# Patient Record
Sex: FEMALE | Race: WHITE | Employment: STUDENT | URBAN - METROPOLITAN AREA
[De-identification: names, ages, dates, MRNs, and addresses within clinical notes are randomized per-mention and may not be internally consistent; named-entity substitution may affect disease eponyms.]

---

## 2018-05-04 ENCOUNTER — TRANSFERRED RECORDS (OUTPATIENT)
Dept: HEALTH INFORMATION MANAGEMENT | Facility: CLINIC | Age: 23
End: 2018-05-04

## 2018-05-04 ENCOUNTER — OFFICE VISIT (OUTPATIENT)
Dept: ORTHOPEDICS | Facility: CLINIC | Age: 23
End: 2018-05-04
Payer: COMMERCIAL

## 2018-05-04 ENCOUNTER — MEDICAL CORRESPONDENCE (OUTPATIENT)
Dept: HEALTH INFORMATION MANAGEMENT | Facility: CLINIC | Age: 23
End: 2018-05-04

## 2018-05-04 VITALS
WEIGHT: 136 LBS | BODY MASS INDEX: 21.35 KG/M2 | DIASTOLIC BLOOD PRESSURE: 74 MMHG | SYSTOLIC BLOOD PRESSURE: 106 MMHG | HEIGHT: 67 IN

## 2018-05-04 DIAGNOSIS — S52.571A OTHER CLOSED INTRA-ARTICULAR FRACTURE OF DISTAL END OF RIGHT RADIUS, INITIAL ENCOUNTER: Primary | ICD-10-CM

## 2018-05-04 NOTE — LETTER
Verification of Appointment  May 4, 2018     Seen today: yes    Patient:  Karen Aguayo  :   1995  MRN:     [id number]  Physician: JOSE G CARLISLE    Karen Aguayo was seen in clinic today for her right wrist fracture. Due to her splint and potential for fracture displacement, she is unable to participate in orchestra or write. She will need to remain in the splint for the next week and then potentially progress into a cast. Please provide accommodations for all writing, activities using hand, and band.  She will return to the clinic on Date: 18.      The next clinic appointment is scheduled for (date/time) 18 at 1pm.    Patient limitations:  Writing, band, and all activities that aggravate wrist fracture        Electronically signed by Jose G Carlisle DO

## 2018-05-04 NOTE — PROGRESS NOTES
SPORTS & ORTHOPEDIC WALK-IN VISIT 5/4/2018    Primary Care Physician:      Reason for visit:     What part of your body is injured / painful?  right wrist    What caused the injury /pain? {DS INJURY CAUSE:300276}    How long ago did your injury occur or pain begin? {DOI:280861}    What are your most bothersome symptoms? {SYMPTOMS:347165}    How would you characterize your symptom?  {PAIN ASSESSMENT:870642}    What makes your symptoms better? {SX BETTER:769312}    What makes your symptoms worse? {SX WORSE:263100}    Have you been previously seen for this problem? {YES/NO:253523}    Medical History:    Any recent changes to your medical history? {YES/NO:062906}    Any new medication prescribed since last visit? {YES/NO:229309}    Have you had surgery on this body part before? {YES/NO SX:070835}    Social History:    Occupation: ***    Handedness: {HANDDOMINANCE:163881}    Exercise: {EXERCISE TYPE:273202}    Review of Systems:    Do you have fever, chills, weight loss? {YES/NO:348579}    Do you have any vision problems? {YES/NO:564542}    Do you have any chest pain or edema? {YES/NO:864792}    Do you have any shortness of breath or wheezing?  {YES/NO:699803}    Do you have stomach problems? {YES/NO:577254}    Do you have any numbness or focal weakness? {YES/NO:342006}    Do you have diabetes? {YES/NO:546031}    Do you have problems with bleeding or clotting? {YES/NO:480129}    Do you have an rashes or other skin lesions? {YES/NO:607421}
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
## 2018-05-04 NOTE — MR AVS SNAPSHOT
After Visit Summary   2018    Karen Aguayo    MRN: [id number]           Patient Information     Date Of Birth          1995        Visit Information        Provider Department      2018 3:10 PM Jose G Cannon DO [facility]opa[facility] Walk [facility]        Today's Diagnoses     Other closed intra-articular fracture of distal end of right radius, initial encounter    -  1       Follow-ups after your visit        Your next 10 appointments already scheduled     May 09, 2018  1:00 PM CDT   (Arrive by 12:45 PM)   NEW HAND FRACTURE with Ezio Yin MD   [facility] ([facility])    [address] [place] [address]  4th [facility] [id number] [phone]              Who to contact     Please call your clinic at [phone] to:    Ask questions about your health    Make or cancel appointments    Discuss your medicines    Learn about your test results    Speak to your doctor            Additional Information About Your Visit        MyChart Information     [employer] is an electronic gateway that provides easy, online access to your medical records. With [employer], you can request a clinic appointment, read your test results, renew a prescription or communicate with your care team.     To sign up for [employer] visit the website at [email or web address]   You will be asked to enter the access code listed below, as well as some personal information. Please follow the directions to create your username and password.     Your access code is: [email or web address]  Expires: 2018  5:00 PM     Your access code will  in 90 days. If you need help or a new code, please contact your [facility] or call [phone] for assistance.        Care EveryWhere ID     This is your Care EveryWhere ID. This could be used by other organizations to access your [facility] medical records  [id number]        Your Vitals Were      "Height BMI (Body Mass Index)                1.702 m (5' 7\") 21.3 kg/m2           Blood Pressure from Last 3 Encounters:   05/04/18 106/74    Weight from Last 3 Encounters:   05/04/18 61.7 kg (136 lb)              Today, you had the following     No orders found for display       Primary Care Provider    None Specified       No primary provider on file.        Equal Access to Services     [facility]: Hadii aad ku hadasho Soheathali, waaxda luqadaha, qaybta kaalmada adeegyada, waxay idiin haycaron atriq fiorefunmilayobartolo bolaños . So [facility] [phone].    ATENCIÓN: Si habla español, tiene a vallejo disposición servicios gratuitos de asistencia lingüística. Llame al [phone].    We comply with applicable federal civil rights laws and [place] laws. We do not discriminate on the basis of race, color, national origin, age, disability, sex, sexual orientation, or gender identity.            Thank you!     Thank you for choosing [facility] SPORTS AND ORTHOPAEDIC WALK IN CLINIC  for your care. Our goal is always to provide you with excellent care. Hearing back from our patients is one way we can continue to improve our services. Please take a few minutes to complete the written survey that you may receive in the mail after your visit with us. Thank you!             Your Updated Medication List - Protect others around you: Learn how to safely use, store and throw away your medicines at www.disposemymeds.org.      Notice  As of 5/4/2018 11:59 PM    You have not been prescribed any medications.      "
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
## 2018-05-07 NOTE — TELEPHONE ENCOUNTER
FUTURE VISIT INFORMATION      FUTURE VISIT INFORMATION:    Date: 5/9/18    Time:     Location: [facility]   REFERRAL INFORMATION:    Referring provider:  Dr. Cannon     Referring providers clinic:  Walk in Clinic     Reason for visit/diagnosis  Right distal radius fracture     RECORDS REQUESTED FROM:       Clinic name Comments Records Status Imaging Status   Walk in Clinic  5/4/18 Internal PACS     RECORDS STATUS

## 2018-05-08 ENCOUNTER — PRE VISIT (OUTPATIENT)
Dept: ORTHOPEDICS | Facility: CLINIC | Age: 23
End: 2018-05-08

## 2018-05-08 DIAGNOSIS — S52.501A FRACTURE OF RIGHT DISTAL RADIUS: Primary | ICD-10-CM

## 2018-05-08 NOTE — TELEPHONE ENCOUNTER
Patient is being referred by Dr. Jose G Cannon for right distal radius fracture.    Patient is new to this provider.  Patient has outside records have been placed in chart prep folder    Patient is coming to clinic for initial consultation.      Per standing orders, right wrist xrays have been ordered and scheduled.     Patient visit type and questionnaires have been reviewed and are correct for this appointment? Yes    Britany Aquino, ATC

## 2018-05-09 ENCOUNTER — PRE VISIT (OUTPATIENT)
Dept: ORTHOPEDICS | Facility: CLINIC | Age: 23
End: 2018-05-09

## 2018-05-09 ENCOUNTER — OFFICE VISIT (OUTPATIENT)
Dept: ORTHOPEDICS | Facility: CLINIC | Age: 23
End: 2018-05-09
Payer: COMMERCIAL

## 2018-05-09 ENCOUNTER — RADIANT APPOINTMENT (OUTPATIENT)
Dept: GENERAL RADIOLOGY | Facility: CLINIC | Age: 23
End: 2018-05-09
Attending: ORTHOPAEDIC SURGERY
Payer: COMMERCIAL

## 2018-05-09 ENCOUNTER — RADIANT APPOINTMENT (OUTPATIENT)
Dept: CT IMAGING | Facility: CLINIC | Age: 23
End: 2018-05-09
Attending: ORTHOPAEDIC SURGERY
Payer: COMMERCIAL

## 2018-05-09 VITALS — HEIGHT: 67 IN | BODY MASS INDEX: 21.25 KG/M2 | WEIGHT: 135.4 LBS

## 2018-05-09 DIAGNOSIS — S62.109A FRACTURE OF WRIST: ICD-10-CM

## 2018-05-09 DIAGNOSIS — S52.501A FRACTURE OF RIGHT DISTAL RADIUS: ICD-10-CM

## 2018-05-09 DIAGNOSIS — S52.501A CLOSED FRACTURE OF DISTAL END OF RIGHT RADIUS, UNSPECIFIED FRACTURE MORPHOLOGY, INITIAL ENCOUNTER: Primary | ICD-10-CM

## 2018-05-09 NOTE — NURSING NOTE
Teaching Flowsheet   Relevant Diagnosis: Right distal radius fracture  Teaching Topic: ORIF right distal radius      Person(s) involved in teaching:   Patient     Motivation Level:  Asks Questions: Yes  Eager to Learn: Yes  Cooperative: Yes  Receptive (willing/able to accept information): Yes  Any cultural factors/[employer] beliefs that may influence understanding or compliance? No     Patient demonstrates understanding of the following:  Reason for the appointment, diagnosis and treatment plan: Yes  Knowledge of proper use of medications and conditions for which they are ordered (with special attention to potential side effects or drug interactions): Yes  Which situations necessitate calling provider and whom to contact: Yes    Teaching Concerns Addressed:   Comments:  H&P completed today in clinic.       Nutritional needs and diet plan: Yes  Pain management techniques: Yes  Wound Care: Yes  How and/when to access community resources: Yes     Instructional Materials Used/Given: Pre-op packet given and reviewed with patient.  Antiseptic soap given.  She will get an CT completed today.  Dr Yin will review the CT and then we will decide on when surgery date will be.  Patient will be called today with a surgery date.   Patient will need to follow up with Dr. Yin in 2 weeks.    Her phone number and email was updated in chart.     Time spent with patient: 15 minutes.

## 2018-05-09 NOTE — LETTER
Date:May 14, 2018      Patient was self referred, no letter generated. Do not send.        [facility] Health Information
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
## 2018-05-09 NOTE — LETTER
Date:May 14, 2018      Patient was self referred, no letter generated. Do not send.        [facility] Information
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
## 2018-05-09 NOTE — MR AVS SNAPSHOT
After Visit Summary   5/9/2018    Karen Aguayo    MRN: [id number]           Patient Information     Date Of Birth          1995        Visit Information        Provider Department      5/9/2018 1:00 PM Ezio Yin MD [facility]        Today's Diagnoses     Closed fracture of distal end of right radius, unspecified fracture morphology, initial encounter    -  1       Follow-ups after your visit        Your next 10 appointments already scheduled     May 15, 2018   Procedure with Ezio Yin MD   [facility] ([facility] [place])    [address] [place] [address]  5th [facility] [id number] [phone]           Located in the Clinics and Surgery Center at [address] [place] [address].   parking is very convenient and highly recommended.  is a $6 flat rate fee.  Both  and self parkers should enter the main arrival plaza from [facility]; parking attendants will direct you based on your parking preference.            May 30, 2018 10:15 AM CDT   (Arrive by 10:00 AM)   RETURN HAND with Ezio Yin MD   [facility] ([facility] [place])    [address] [place] [address]  4th [facility] [id number] [phone]              Who to contact     Please call your clinic at [phone] to:    Ask questions about your health    Make or cancel appointments    Discuss your medicines    Learn about your test results    Speak to your doctor            Additional Information About Your Visit        MyChart Information     [employer]ipp[employer]t is an electronic gateway that provides easy, online access to your medical records. With Kor[employer], you can request a clinic appointment, read your test results, renew a prescription or communicate with your care team.     To sign up for Hippflowt visit the website at [email or web address]   You will be asked to enter the access code  "listed below, as well as some personal information. Please follow the directions to create your username and password.     Your access code is: [email or web address]  Expires: 2018  5:00 PM     Your access code will  in 90 days. If you need help or a new code, please contact your [facility] or call [phone] for assistance.        Care EveryWhere ID     This is your Care EveryWhere ID. This could be used by other organizations to access your [facility] medical records  [id number]        Your Vitals Were     Height BMI (Body Mass Index)                1.702 m (5' 7\") 21.21 kg/m2           Blood Pressure from Last 3 Encounters:   18 106/74    Weight from Last 3 Encounters:   18 61.4 kg (135 lb 6.4 oz)   18 61.7 kg (136 lb)              We Performed the Following     APPLY SHORT ARM SPLINT STATIC     Ita-Operative Worksheet (Hand)     Short Arm Splint, Adult (11 Years Or Older), Fiberglass        Primary Care Provider    None Specified       No primary provider on file.        Equal Access to Services     [facility]: Hadii lizzie garcia Soanuj, waaxda lucharli, qaybta kaalmakatherine torres, tate bolaños . So [facility] [phone].    ATENCIÓN: Si habla español, tiene a vallejo disposición servicios gratuitos de asistencia lingüística. Aftabame al [phone].    We comply with applicable federal civil rights laws and [place] laws. We do not discriminate on the basis of race, color, national origin, age, disability, sex, sexual orientation, or gender identity.            Thank you!     Thank you for choosing [facility]  for your care. Our goal is always to provide you with excellent care. Hearing back from our patients is one way we can continue to improve our services. Please take a few minutes to complete the written survey that you may receive in the mail after your visit with us. Thank you!             Your Updated Medication " List - Protect others around you: Learn how to safely use, store and throw away your medicines at www.disposemymeds.org.      Notice  As of 5/9/2018 11:59 PM    You have not been prescribed any medications.
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
## 2018-05-09 NOTE — NURSING NOTE
Patient is placed into a well fitting short arm volar OrthoGlass splint using the standard method. She is educated on proper splint care.

## 2018-05-10 ENCOUNTER — TELEPHONE (OUTPATIENT)
Dept: ORTHOPEDICS | Facility: CLINIC | Age: 23
End: 2018-05-10

## 2018-05-10 NOTE — TELEPHONE ENCOUNTER
Called placed to patient to review surgery plan for 5/15/18 with Dr. Yin.  Voicemail left with surgery time of 8am and check in time of 6:30am at the [facility].  She has the clinic phone number for further questions or concerns.

## 2018-05-12 ENCOUNTER — RADIANT APPOINTMENT (OUTPATIENT)
Dept: GENERAL RADIOLOGY | Facility: CLINIC | Age: 23
End: 2018-05-12
Attending: ORTHOPAEDIC SURGERY
Payer: COMMERCIAL

## 2018-05-12 DIAGNOSIS — S52.501A CLOSED FRACTURE OF DISTAL END OF RIGHT RADIUS, UNSPECIFIED FRACTURE MORPHOLOGY, INITIAL ENCOUNTER: ICD-10-CM

## 2018-05-14 ENCOUNTER — ANESTHESIA EVENT (OUTPATIENT)
Dept: SURGERY | Facility: AMBULATORY SURGERY CENTER | Age: 23
End: 2018-05-14

## 2018-05-15 ENCOUNTER — RADIANT APPOINTMENT (OUTPATIENT)
Dept: RADIOLOGY | Facility: AMBULATORY SURGERY CENTER | Age: 23
End: 2018-05-15
Attending: ORTHOPAEDIC SURGERY
Payer: COMMERCIAL

## 2018-05-15 ENCOUNTER — HOSPITAL ENCOUNTER (OUTPATIENT)
Facility: AMBULATORY SURGERY CENTER | Age: 23
End: 2018-05-15
Attending: ORTHOPAEDIC SURGERY
Payer: COMMERCIAL

## 2018-05-15 ENCOUNTER — ANESTHESIA (OUTPATIENT)
Dept: SURGERY | Facility: AMBULATORY SURGERY CENTER | Age: 23
End: 2018-05-15

## 2018-05-15 ENCOUNTER — SURGERY (OUTPATIENT)
Age: 23
End: 2018-05-15

## 2018-05-15 VITALS
RESPIRATION RATE: 14 BRPM | WEIGHT: 137.5 LBS | DIASTOLIC BLOOD PRESSURE: 62 MMHG | HEIGHT: 67 IN | SYSTOLIC BLOOD PRESSURE: 105 MMHG | BODY MASS INDEX: 21.58 KG/M2 | TEMPERATURE: 98.6 F | OXYGEN SATURATION: 97 %

## 2018-05-15 DIAGNOSIS — S52.561A CLOSED BARTON'S FRACTURE OF RIGHT RADIUS, INITIAL ENCOUNTER: Primary | ICD-10-CM

## 2018-05-15 DIAGNOSIS — R52 PAIN: ICD-10-CM

## 2018-05-15 LAB
HCG UR QL: NEGATIVE
INTERNAL QC OK POCT: YES

## 2018-05-15 RX ORDER — AMOXICILLIN 250 MG
1-2 CAPSULE ORAL 2 TIMES DAILY
Qty: 30 TABLET | Refills: 0 | Status: SHIPPED | OUTPATIENT
Start: 2018-05-15 | End: 2020-01-26

## 2018-05-15 RX ORDER — ONDANSETRON 2 MG/ML
INJECTION INTRAMUSCULAR; INTRAVENOUS PRN
Status: DISCONTINUED | OUTPATIENT
Start: 2018-05-15 | End: 2018-05-15

## 2018-05-15 RX ORDER — NALOXONE HYDROCHLORIDE 0.4 MG/ML
.1-.4 INJECTION, SOLUTION INTRAMUSCULAR; INTRAVENOUS; SUBCUTANEOUS
Status: DISCONTINUED | OUTPATIENT
Start: 2018-05-15 | End: 2018-05-15 | Stop reason: HOSPADM

## 2018-05-15 RX ORDER — KETAMINE HYDROCHLORIDE 10 MG/ML
INJECTION INTRAMUSCULAR; INTRAVENOUS PRN
Status: DISCONTINUED | OUTPATIENT
Start: 2018-05-15 | End: 2018-05-15

## 2018-05-15 RX ORDER — ONDANSETRON 2 MG/ML
4 INJECTION INTRAMUSCULAR; INTRAVENOUS EVERY 30 MIN PRN
Status: DISCONTINUED | OUTPATIENT
Start: 2018-05-15 | End: 2018-05-16 | Stop reason: HOSPADM

## 2018-05-15 RX ORDER — SODIUM CHLORIDE, SODIUM LACTATE, POTASSIUM CHLORIDE, CALCIUM CHLORIDE 600; 310; 30; 20 MG/100ML; MG/100ML; MG/100ML; MG/100ML
INJECTION, SOLUTION INTRAVENOUS CONTINUOUS
Status: DISCONTINUED | OUTPATIENT
Start: 2018-05-15 | End: 2018-05-15 | Stop reason: HOSPADM

## 2018-05-15 RX ORDER — FLUMAZENIL 0.1 MG/ML
0.2 INJECTION, SOLUTION INTRAVENOUS
Status: DISCONTINUED | OUTPATIENT
Start: 2018-05-15 | End: 2018-05-15 | Stop reason: HOSPADM

## 2018-05-15 RX ORDER — KETOROLAC TROMETHAMINE 30 MG/ML
INJECTION, SOLUTION INTRAMUSCULAR; INTRAVENOUS PRN
Status: DISCONTINUED | OUTPATIENT
Start: 2018-05-15 | End: 2018-05-15

## 2018-05-15 RX ORDER — GABAPENTIN 300 MG/1
300 CAPSULE ORAL ONCE
Status: COMPLETED | OUTPATIENT
Start: 2018-05-15 | End: 2018-05-15

## 2018-05-15 RX ORDER — PROPOFOL 10 MG/ML
INJECTION, EMULSION INTRAVENOUS CONTINUOUS PRN
Status: DISCONTINUED | OUTPATIENT
Start: 2018-05-15 | End: 2018-05-15

## 2018-05-15 RX ORDER — FENTANYL CITRATE 50 UG/ML
25-50 INJECTION, SOLUTION INTRAMUSCULAR; INTRAVENOUS
Status: DISCONTINUED | OUTPATIENT
Start: 2018-05-15 | End: 2018-05-15 | Stop reason: HOSPADM

## 2018-05-15 RX ORDER — OXYCODONE AND ACETAMINOPHEN 5; 325 MG/1; MG/1
1-2 TABLET ORAL EVERY 4 HOURS PRN
Qty: 30 TABLET | Refills: 0 | Status: SHIPPED | OUTPATIENT
Start: 2018-05-15 | End: 2020-01-26

## 2018-05-15 RX ORDER — ONDANSETRON 4 MG/1
4 TABLET, ORALLY DISINTEGRATING ORAL EVERY 30 MIN PRN
Status: DISCONTINUED | OUTPATIENT
Start: 2018-05-15 | End: 2018-05-16 | Stop reason: HOSPADM

## 2018-05-15 RX ORDER — PROPOFOL 10 MG/ML
INJECTION, EMULSION INTRAVENOUS PRN
Status: DISCONTINUED | OUTPATIENT
Start: 2018-05-15 | End: 2018-05-15

## 2018-05-15 RX ORDER — SODIUM CHLORIDE, SODIUM LACTATE, POTASSIUM CHLORIDE, CALCIUM CHLORIDE 600; 310; 30; 20 MG/100ML; MG/100ML; MG/100ML; MG/100ML
INJECTION, SOLUTION INTRAVENOUS CONTINUOUS
Status: DISCONTINUED | OUTPATIENT
Start: 2018-05-15 | End: 2018-05-16 | Stop reason: HOSPADM

## 2018-05-15 RX ORDER — BUPIVACAINE HYDROCHLORIDE 5 MG/ML
INJECTION, SOLUTION EPIDURAL; INTRACAUDAL PRN
Status: DISCONTINUED | OUTPATIENT
Start: 2018-05-15 | End: 2018-05-15

## 2018-05-15 RX ORDER — ACETAMINOPHEN 325 MG/1
975 TABLET ORAL ONCE
Status: COMPLETED | OUTPATIENT
Start: 2018-05-15 | End: 2018-05-15

## 2018-05-15 RX ADMIN — KETAMINE HYDROCHLORIDE 30 MG: 10 INJECTION INTRAMUSCULAR; INTRAVENOUS at 08:38

## 2018-05-15 RX ADMIN — ACETAMINOPHEN 975 MG: 325 TABLET ORAL at 07:40

## 2018-05-15 RX ADMIN — GABAPENTIN 300 MG: 300 CAPSULE ORAL at 07:40

## 2018-05-15 RX ADMIN — ONDANSETRON 4 MG: 2 INJECTION INTRAMUSCULAR; INTRAVENOUS at 08:30

## 2018-05-15 RX ADMIN — BUPIVACAINE HYDROCHLORIDE 15 ML: 5 INJECTION, SOLUTION EPIDURAL; INTRACAUDAL at 07:40

## 2018-05-15 RX ADMIN — PROPOFOL: 10 INJECTION, EMULSION INTRAVENOUS at 09:09

## 2018-05-15 RX ADMIN — PROPOFOL 150 MCG/KG/MIN: 10 INJECTION, EMULSION INTRAVENOUS at 08:18

## 2018-05-15 RX ADMIN — KETOROLAC TROMETHAMINE 30 MG: 30 INJECTION, SOLUTION INTRAMUSCULAR; INTRAVENOUS at 10:04

## 2018-05-15 RX ADMIN — FENTANYL CITRATE 50 MCG: 50 INJECTION, SOLUTION INTRAMUSCULAR; INTRAVENOUS at 07:37

## 2018-05-15 RX ADMIN — SODIUM CHLORIDE, SODIUM LACTATE, POTASSIUM CHLORIDE, CALCIUM CHLORIDE: 600; 310; 30; 20 INJECTION, SOLUTION INTRAVENOUS at 07:36

## 2018-05-15 RX ADMIN — PROPOFOL 120 MG: 10 INJECTION, EMULSION INTRAVENOUS at 08:36

## 2018-05-15 NOTE — BRIEF OP NOTE
[facility] Brief Operative Note    Pre-operative diagnosis: Right Distal Radius Fracture   Post-operative diagnosis * No post-op diagnosis entered *   Procedure: Procedure(s):  Open Reduction Internal Fixation Right Distal Radius Fracture  - Wound Class: I-Clean   Surgeon: zEio Yin   Assistants(s): Jackeline Pabon PGY-4   Estimated blood loss: 15 mL    Specimens: None   Findings: Stable fixation of right intraarticular distal radius fracture. Log split through lunate facet and dye punch through scaphoid facet. Tamped up the scaphoid facet and kept elevated with crushed cancellous bone graft. Synthes volar locking plate.     -Keep splint in place until clinic follow up. Keep clean and dry.   -Ice, elevate.   -oxycodone PRN pain control.   -Ok for finger ROM.    Jackeline Pabon PGY-4  Orthopedic Surgery  [phone]

## 2018-05-15 NOTE — ANESTHESIA PROCEDURE NOTES
Peripheral Nerve Block Procedure Note    Staff:     Anesthesiologist:  IONA SOTO    Resident/CRNA:  KATHLEEN RUDOLPH    Block performed by resident/CRNA in the presence of a teaching physician    Location: Pre-op  Procedure Start/Stop TImes:      5/15/2018 7:30 AM     5/15/2018 7:40 AM    patient identified, IV checked, site marked, risks and benefits discussed, informed consent, monitors and equipment checked, pre-op evaluation, at physician/surgeon's request and post-op pain management      Correct Patient: Yes      Correct Position: Yes      Correct Site: Yes      Correct Procedure: Yes      Correct Laterality:  Yes    Site Marked:  Yes  Procedure details:     Procedure:  Supraclavicular    Laterality:  Right    Position:  Supine    Sterile Prep: chloraprep, mask and sterile gloves      Local skin infiltration:  None    Needle:  Short bevel and insulated    Needle gauge:  21    Needle length (mm):  100    Ultrasound: Yes      Ultrasound used to identify targeted nerve, plexus, or vascular structure and placed a needle adjacent to it      Permanent Image entered into patiient's record      Abnormal pain on injection: No      Blood Aspirated: No      Paresthesias:  No    Bleeding at site: No      Bolus via:  Needle    Infusion Method:  Single Shot    Complications:  None  Assessment/Narrative:     Injection made incrementally with aspirations every (mL):  5     Discussed with Patient Off-Label use of Liposomal Bupivacaine (Exparel) for Nerve Block.    Relevant risks & benefits were discussed with patient.    All questions were answered and there was agreement to proceed.    Patient signed Off-Label Use of Exparel Consent Form.

## 2018-05-15 NOTE — IP AVS SNAPSHOT
[facility]    [address] [place] [id number]    Phone:  [phone]    Fax:  [phone]                                       After Visit Summary   5/15/2018    Karen Aguayo    MRN: [id number]           After Visit Summary Signature Page     I have received my discharge instructions, and my questions have been answered. I have discussed any challenges I see with this plan with the nurse or doctor.    ..........................................................................................................................................  Patient/Patient Representative Signature      ..........................................................................................................................................  Patient Representative Print Name and Relationship to Patient    ..................................................               ................................................  Date                                            Time    ..........................................................................................................................................  Reviewed by Signature/Title    ...................................................              ..............................................  Date                                                            Time

## 2018-05-15 NOTE — ANESTHESIA CARE TRANSFER NOTE
Patient: Karen Aguayo    Procedure(s):  Open Reduction Internal Fixation Right Distal Radius Fracture  - Wound Class: I-Clean    Diagnosis: Right Distal Radius Fracture  Diagnosis Additional Information: No value filed.    Anesthesia Type:   MAC, Periph. Nerve Block for postop pain, Peripheral Nerve Block     Note:  Airway :Nasal Cannula  Patient transferred to:PACU  Comments: 121/71 96%  97.3-80-12  Handoff Report: Identifed the Patient, Identified the Reponsible Provider, Reviewed the pertinent medical history, Discussed the surgical course, Reviewed Intra-OP anesthesia mangement and issues during anesthesia, Set expectations for post-procedure period and Allowed opportunity for questions and acknowledgement of understanding      Vitals: (Last set prior to Anesthesia Care Transfer)    CRNA VITALS  5/15/2018 1006 - 5/15/2018 1043      5/15/2018             Resp Rate (observed): (!)  6                Electronically Signed By: FROY Hutton CRNA  May 15, 2018  10:43 AM

## 2018-05-15 NOTE — IP AVS SNAPSHOT
MRN:[id number]                      After Visit Summary   5/15/2018    Karen Aguayo    MRN: [id number]           Thank you!     Thank you for choosing [facility] for your care. Our goal is always to provide you with excellent care. Hearing back from our patients is one way we can continue to improve our services. Please take a few minutes to complete the written survey that you may receive in the mail after you visit with us. Thank you!        Patient Information     Date Of Birth          1995        About your hospital stay     You were admitted on:  May 15, 2018 You last received care in the:  [facility]    You were discharged on:  May 15, 2018       Who to Call     For medical emergencies, please call 911.  For non-urgent questions about your medical care, please call your primary care provider or clinic, None  For questions related to your surgery, please call your surgery clinic        Attending Provider     Provider Ezio Cody MD Orthopedics       Primary Care Provider    None Specified      After Care Instructions     Discharge Instructions       Post Operative Instructions for Karen Aguayo is a 22 year old female    Surgery: plate fixation of distal radius fracture on 5/15/18.    If you have any questions contact Dr. Yin at the following numbers: if you see Dr. Yin at [facility] call [phone].        Follow up appointment:   You are scheduled to follow up with Dr. Yin 5/30/18.   If you were seen at the [facility] at [facility], your appointment will likely be there.         Activity:   -Move the finger frequently to decrease swelling.  No lifting or gripping with the operative hand.     Pain Medications:   -Take pain medications as prescribed.  Wean off the the narcotic pain medications (Oxycodone, Dilaudid, etc) as tolerated by pain.  To help with this, take the Tylenol and Celebrex (if prescribed) to minimize the  amount of narcotic pain medication you need to take.      -Do not drive while on pain medications or until your leg feels well enough to do so.      Bandage Care and Showering:   -Do not remove the splint. Keep the splint clean and dry. You must cover it with a bag or cast cover to shower.     --Contact Dr. Yin or her staff if there is any drainage from the incision or redness.    Leg Swelling and Icing  -Continue icing your back p 5-6 times per day for approximately 20 minutes each time.  This will help with swelling.    -Some swelling in the fingers and arm is normal after surgery. Elevate on pillows above the level of the heart to decrease pain and swelling.  If the splint feels too tight, you may unwrap the brown outer wrapping to loosen it.      Contact clinic if you note any of the following:  -Fevers greater than 101.5 chills  -Increased pain, redness, swelling or discharge at the surgical site.   -During regular business hours call Dr Velázquez's office and request to speak with his nurse or the triage nurses. If you see him at [facility] call [phone].  -You may also be seen at our Orthopaedic Walk-In clinic that runs 7 days per week from 7a-7p at [address] [place] [id number]   You can call the Walk-In Clinic at [phone]      -FOR URGENT PROBLEMS ONLY, after hours or on weekends call the hospital  at [phone] and ask to speak with the Orthopaedic resident on call.            Ice to affected area       Ice pack to surgical site every 15 minutes per hour for 24 hours            No Dressing Change       No dressing change until follow up appointment.            No weight bearing           Notify Provider       For signs and symptoms of infection: Fever greater than 101, redness, swelling, heat at site, drainage, pus.            Shower        Cover dressing if dressing is not going to be changed today            Wound care       Do not immerse wound in water until sutures  "removed                  Your next 10 appointments already scheduled     May 30, 2018 10:15 AM CDT   (Arrive by 10:00 AM)   RETURN HAND with Ezio Yin MD   [facility] ([facility])    [address] [place] [id number] [phone]              Further instructions from your care team       Instructions for after your surgery    Leave your splint on and keep it dry. Do not remove it or get it wet.     Keep your operative arm elevated as much as possible. This will help with pain and swelling.     Do not use your operative arm for any lifting, pushing, pulling, weight bearing, or other activities.     Wear your sling as needed for comfort. If you choose to wear the sling, be sure to take it off and range your shoulder and elbow (if not included in the splint) a few times a day so they do not get stiff. (If you have received a regional block, wear the sling at all times until the block wears off.)     You will have a follow-up appointment scheduled with Dr. Yin or Annalisa. If you do not know when this appointment is, please call Dr. Yin's office to find out.     Call Dr. Yin's office if you experience any of the following:   Fevers, chills, increasing wound drainage, pain that is not controlled with the medications you have been prescribing, swelling that is not controlled with elevation, problems with your splint, or any other questions or concerns.       Information about liposomal bupivacaine (Exparel)    What is Liposomal Bupivacaine?    Liposomal Bupivacaine is a numbing medication that can help you manage your pain after surgery.  This medication is similar to \"novacaine,\" which is often used by the dentist.  Liposomal bupivacaine is released slowly and can help control pain for up to 72 hours.    What is the purpose of Liposomal Bupivacaine?    To manage your pain after surgery    To help you sleep better, take deep " breaths, walk more comfortable, and feel up to visiting with others    How is the procedure done?    Liposomal bupivacaine is a medication given by an injection.    It is usually given right before your surgery.  If this is the case, you will be awake or sedated, but you should experience minimal pain during the procedure.    For some people, the injection may be given at the very end of your surgery.  It all depends on the type of surgery and your situation.    The procedure usually takes about 5-15 minutes.  An ultrasound machine will help the anesthesiologist insert it in the right place or the surgeon will inject it under direct vision.     A needle is used to place the numbing medication under your skin.  It provides pain relief by numbing the tissue in the area where your surgeon will make the incision.    What can I expect?    You may experience numbness, tingling, or a feeling of heaviness around the area that was injected.    If you experience any of the follow symptoms IMMEDIATELY CALL THE REGIONAL ANESTHESIA PAIN SERVICE:    Numbness or tingling occurs in areas other than around the injection site    Blurry vision    Ringing in your ears    A metallic taste in your mouth    PAGE: Dial [phone].  When prompted, enter the following 4-digit ID number:  [id number].  You will be prompted to enter your phone number; and then enter the # sign.  The clinician on call will call you back.    OR    CALL: Dial [phone].  Let the hospital  know that you are having a problem with a nerve block and that you would like to speak to the regional anesthesia pain service right away.    You should not receive any other type of numbing medication within 4 days after receiving liposomal bupivacaine unless your anesthesiologist approves.      Post Operative Instructions: Regional Anesthetic for Upper Extremity with Liposomal Bupivacaine  General Information:   Regional anesthesia is when local anesthetic or  numbing   medication is injected around the nerves to anesthetize or  numb  the area supplied by that set of nerves. It is a type of analgesia used to control pain and decreases the need for narcotics following surgery.    Types of Regional Blocks:  Interscalene: A block injected into the neck on the operative shoulder/arm of a patient having shoulder surgery  Supraclavicular: A block injected near the clavicle on the operative shoulder of a patient having elbow, forearm, or hand surgery    Procedure:  The type of anesthesia your doctor used to numb your shoulder or arm will usually not start to wear off for 24-48 hours, but may last as long as 72 hours. You should be careful during that period, since it is possible to injure your arm without being aware of the injury. While your arm is numb, you should:    Avoid striking or bumping your arm    Avoid extreme hot or cold    Diet:  There are no restrictions on your diet. You should drink plenty of fluids.     Discomfort:  You will have a tingling and prickly sensation in your arm as the feeling begins to return. You can also expect some discomfort. The amount of discomfort is unpredictable, but if you have more pain than can be controlled with pain medication you should notify your physician.     Pain Medications:  Begin taking your oral pain pills before bedtime and during the night to avoid a sudden onset of pain as part of the block wears off.  Do not engage in drinking, driving, or hazardous occupations while taking pain medication.     Stitches:   You may have stitches or special skin closures. You doctor will inform you when to return to the office to have them removed.     Activity:  On the day of surgery you should try to stay in bed with your hand elevated on pillows. You may resume your normal activity after that, wearing a sling for comfort. Contact your physician if you have any of the problems:     Continued numbness or tingling in the arm or hand after 72  "hours    Swelling of the fingers or fingers that are cold to the touch    Excessive bleeding or drainage    Severe pain  [facility]  Home Care Following Anesthesia  For 24 hours after surgery:  1. Get plenty of rest.  A responsible adult must stay with you for at least 24 hours after you leave the surgery center.  2. Do not drive or use heavy equipment.  If you have weakness or tingling, don't drive or use heavy equipment until this feeling goes away.   3. Do not drink alcohol.   4. Avoid strenuous or risky activities.  Ask for help when climbing stairs.  5. You may feel lightheaded.  IF so, sit for a few minutes before standing.  Have someone help you get up.   6. If you have nausea (feel sick to your stomach): Drink only clear liquids such as apple juice, ginger ale, broth or 7-Up.  Rest may also help.  Be sure to drink enough fluids.  Move to a regular diet as you feel able.   7. You may have a slight fever.  Call the doctor if your fever is over 100 F (37.7 C) (taken under the tongue) or lasts longer than 24 hours.  8. You may have a dry mouth, a sore throat, muscle aches or trouble sleeping. These should go away after 24 hours.  9. Do not make important or legal decisions.   If you use hormonal birth control (such as the pill, patch, ring or implants):  You will need a second form of birth control for 7 days (condoms, a diaphragm or contraceptive foam).  While in the surgery center, you received a medicine called Sugammadex.  Hormonal birth control (such as the pill, patch, ring or implants) will not work as well for a week after taking this medicine.  Today you received a Marcaine or bupivacaine block to numb the nerves near your surgery site.  This is a block using local anesthetic or \"numbing\" medication injected around the nerves to anesthetize or \"numb\" the area supplied by those nerves.  This block is injected into the muscle layer near your surgical site.  The " medication may numb the location where you had surgery for 6-18 hours, but may last up to 24 hours.  If your surgical site is an arm or leg you should be careful with your affected limb, since it is possible to injure your limb without being aware of it due to the numbing.  Until full feeling returns, you should guard against bumping or hitting your limb, and avoid extreme hot or cold temperatures on the skin.  As the block wears off, the feeling will return as a tingling or prickly sensation near your surgical site.  You will experience more discomfort from your incision as the feeling returns.  You may want to take a pain pill (a narcotic or Tylenol if this was prescribed by your surgeon) when you start to experience mild pain before the pain beccomes more severe.  If your pain medications do not control your pain you should notifiy your surgeon.    Tips for taking pain medications  To get the best pain relief possible, remember these points:    Take pain medications as directed, before pain becomes severe.    Pain medication can upset your stomach: taking it with food may help.    Constipation is a common side effect of pain medication. Drink plenty of  fluids.    Eat foods high in fiber. Take a stool softener if recommended by your doctor or pharmacist.    Do not drink alcohol, drive or operate machinery while taking pain medications.    Ask about other ways to control pain, such as with heat, ice or relaxation.    Tylenol/Acetaminophen Consumption  To help encourage the safe use of acetaminophen, the makers of TYLENOL  have lowered the maximum daily dose for single-ingredient Extra Strength TYLENOL  (acetaminophen) products sold in the [place] from 8 pills per day (4,000 mg) to 6 pills per day (3,000 mg). The dosing interval has also changed from 2 pills every 4-6 hours to 2 pills every 6 hours.    If you feel your pain relief is insufficient, you may take Tylenol/Acetaminophen in addition to your narcotic pain  "medication.     Be careful not to exceed 3,000 mg of Tylenol/Acetaminophen in a 24 hour period from all sources.    If you are taking extra strength Tylenol/acetaminophen (500 mg), the maximum dose is 6 tablets in 24 hours.    If you are taking regular strength acetaminophen (325 mg), the maximum dose is 9 tablets in 24 hours.    Call a doctor for any of the followin. Signs of infection (fever, growing tenderness at the surgery site, a large amount of drainage or bleeding, severe pain, foul-smelling drainage, redness, swelling).  2. It has been over 8 to 10 hours since surgery and you are still not able to urinate (pass water).  3. Headache for over 24 hours.  4. Numbness, tingling or weakness the day after surgery (if you had spinal anesthesia).  Your doctor is:       Dr. Ezio Yin, Orthopaedics: [phone]               Or dial [phone] and ask for the resident on call for:  Orthopaedics  For emergency care, call the:  [facility] Emergency Department: [phone] (TTY for hearing impaired: [phone])                        Pending Results     Date and Time Order Name Status Description    5/15/2018 0806 XR SURGERY LEOBARDO FLUORO LESS THAN 5 MIN W STILLS In process             Admission Information     Date & Time Provider Department Dept. Phone    5/15/2018 Ezio Yin MD [facility] [phone]      Your Vitals Were     Blood Pressure Temperature Respirations Height Weight       121/70 99.3  F (37.4  C) (Temporal) 16 1.702 m (5' 7\") 62.4 kg (137 lb 8 oz)     Pulse Oximetry BMI (Body Mass Index)                99% 21.54 kg/m2          [employer] Information     [employer] is an electronic gateway that provides easy, online access to your medical records. With [employer], you can request a clinic appointment, read your test results, renew a prescription or communicate with your care team.     To sign up for [employer] visit the website at [email or web address]   You " will be asked to enter the access code listed below, as well as some personal information. Please follow the directions to create your username and password.     Your access code is: [email or web address]  Expires: 2018  5:00 PM     Your access code will  in 90 days. If you need help or a new code, please contact your [facility] or call [phone] for assistance.        Care EveryWhere ID     This is your Care EveryWhere ID. This could be used by other organizations to access your [facility] medical records  [id number]        Equal Access to Services     [facility] CenterABDIEL : Hadii lizzie dyer hadzeniao Soanuj, waaxda luqadaha, qaybta kaalmada brian, tate bolaños . So [facility] [phone].    ATENCIÓN: Si habla español, tiene a vallejo disposición servicios gratuitos de asistencia lingüística. [facility] [phone].    We comply with applicable federal civil rights laws and [place] laws. We do not discriminate on the basis of race, color, national origin, age, disability, sex, sexual orientation, or gender identity.               Review of your medicines      START taking        Dose / Directions    oxyCODONE-acetaminophen 5-325 MG per tablet   Commonly known as:  PERCOCET   Used for:  Closed Olmos's fracture of right radius, initial encounter        Dose:  1-2 tablet   Take 1-2 tablets by mouth every 4 hours as needed for pain (moderate to severe)   Quantity:  30 tablet   Refills:  0       senna-docusate 8.6-50 MG per tablet   Commonly known as:  SENOKOT-S;PERICOLACE   Used for:  Closed Olmos's fracture of right radius, initial encounter        Dose:  1-2 tablet   Take 1-2 tablets by mouth 2 times daily Take while on oral narcotics to prevent or treat constipation.   Quantity:  30 tablet   Refills:  0            Where to get your medicines      These medications were sent to [facility] [place], [place] - [address] [place] [address] [phone]  "[facility] [address] [address]    Hours:  TRANSPLANT PHONE NUMBER [phone] Phone:  [phone]     senna-docusate 8.6-50 MG per tablet         Some of these will need a paper prescription and others can be bought over the counter. Ask your nurse if you have questions.     Bring a paper prescription for each of these medications     oxyCODONE-acetaminophen 5-325 MG per tablet                Protect others around you: Learn how to safely use, store and throw away your medicines at www.disposemymeds.org.        Information about your nerve block     Today you received a block to numb the nerves near your surgery site.    This is a block using local anesthetic or \"numbing\" medication injected around the nerves to anesthetize or \"numb\" the area supplied by those nerves. This block is injected into the muscle layer near your surgical site. The type of anesthesia (Exparel) your anesthesia team used to numb your abdomen may give you relief for up to 72 hours.     Diet: There are no diet restrictions, but you should drink plenty of fluids, unless you are on a fluid-restricted diet.     Activity: If your surgical site is an arm or leg you should be careful with your affected limb, since it is possible to injure your limb without being aware of it due to the numbing. Until full feeling returns, you should guard against bumping or hitting your limb, and avoid extreme hot or cold temperatures on the skin.    Pain Medication: As the block wears off, the feeling will return as a tingling or prickly sensation near your surgical site. You will experience more discomfort from your incisions as the feeling returns. You may want to take a pain pill (a narcotic or Tylenol if this was prescribed by your surgeon) when you start to experience mild pain, before the pain becomes more severe. If your pain medications do not control your pain, you should notify your surgeon. If you are taking narcotics for pain management, " do not drink alcohol, drive a car, or perform hazardous activities.  If you have questions or concerns you may call your surgeon at the number provided with your discharge instructions.     Call your surgeon if you experience blurry vision, ringing in the ears or metallic taste in your mouth.         Information about OPIOIDS     PRESCRIPTION OPIOIDS: WHAT YOU NEED TO KNOW   You have a prescription for an opioid (narcotic) pain medicine. Opioids can cause addiction. If you have a history of chemical dependency of any type, you are at a higher risk of becoming addicted to opioids. Only take this medicine after all other options have been tried. Take it for as short a time and as few doses as possible.     Do not:    Drive. If you drive while taking these medicines, you could be arrested for driving under the influence (DUI).    Operate heavy machinery    Do any other dangerous activities while taking these medicines.     Drink any alcohol while taking these medicines.      Take with any other medicines that contain acetaminophen. Read all labels carefully. Look for the word  acetaminophen  or  Tylenol.  Ask your pharmacist if you have questions or are unsure.    Store your pills in a secure place, locked if possible. We will not replace any lost or stolen medicine. If you don t finish your medicine, please throw away (dispose) as directed by your pharmacist. The [place] Pollution Control Agency has more information about safe disposal: https://www.pca.[facility].[place].us/living-green/managing-unwanted-medications    All opioids tend to cause constipation. Drink plenty of water and eat foods that have a lot of fiber, such as fruits, vegetables, prune juice, apple juice and high-fiber cereal. Take a laxative (Miralax, milk of magnesia, Colace, Senna) if you don t move your bowels at least every other day.              Medication List: This is a list of all your medications and when to take them. Check marks below indicate  your daily home schedule. Keep this list as a reference.      Medications           Morning Afternoon Evening Bedtime As Needed    oxyCODONE-acetaminophen 5-325 MG per tablet   Commonly known as:  PERCOCET   Take 1-2 tablets by mouth every 4 hours as needed for pain (moderate to severe)                                senna-docusate 8.6-50 MG per tablet   Commonly known as:  SENOKOT-S;PERICOLACE   Take 1-2 tablets by mouth 2 times daily Take while on oral narcotics to prevent or treat constipation.

## 2018-05-15 NOTE — DISCHARGE INSTRUCTIONS
"Instructions for after your surgery    Leave your splint on and keep it dry. Do not remove it or get it wet.     Keep your operative arm elevated as much as possible. This will help with pain and swelling.     Do not use your operative arm for any lifting, pushing, pulling, weight bearing, or other activities.     Wear your sling as needed for comfort. If you choose to wear the sling, be sure to take it off and range your shoulder and elbow (if not included in the splint) a few times a day so they do not get stiff. (If you have received a regional block, wear the sling at all times until the block wears off.)     You will have a follow-up appointment scheduled with Dr. Yin or Annalisa. If you do not know when this appointment is, please call Dr. Yin's office to find out.     Call Dr. Yin's office if you experience any of the following:   Fevers, chills, increasing wound drainage, pain that is not controlled with the medications you have been prescribing, swelling that is not controlled with elevation, problems with your splint, or any other questions or concerns.       Information about liposomal bupivacaine (Exparel)    What is Liposomal Bupivacaine?    Liposomal Bupivacaine is a numbing medication that can help you manage your pain after surgery.  This medication is similar to \"novacaine,\" which is often used by the dentist.  Liposomal bupivacaine is released slowly and can help control pain for up to 72 hours.    What is the purpose of Liposomal Bupivacaine?    To manage your pain after surgery    To help you sleep better, take deep breaths, walk more comfortable, and feel up to visiting with others    How is the procedure done?    Liposomal bupivacaine is a medication given by an injection.    It is usually given right before your surgery.  If this is the case, you will be awake or sedated, but you should experience minimal pain during the procedure.    For some people, the injection may be given at " the very end of your surgery.  It all depends on the type of surgery and your situation.    The procedure usually takes about 5-15 minutes.  An ultrasound machine will help the anesthesiologist insert it in the right place or the surgeon will inject it under direct vision.     A needle is used to place the numbing medication under your skin.  It provides pain relief by numbing the tissue in the area where your surgeon will make the incision.    What can I expect?    You may experience numbness, tingling, or a feeling of heaviness around the area that was injected.    If you experience any of the follow symptoms IMMEDIATELY CALL THE REGIONAL ANESTHESIA PAIN SERVICE:    Numbness or tingling occurs in areas other than around the injection site    Blurry vision    Ringing in your ears    A metallic taste in your mouth    PAGE: Dial [phone].  When prompted, enter the following 4-digit ID number:  [id number].  You will be prompted to enter your phone number; and then enter the # sign.  The clinician on call will call you back.    OR    CALL: Dial [phone].  Let the hospital  know that you are having a problem with a nerve block and that you would like to speak to the regional anesthesia pain service right away.    You should not receive any other type of numbing medication within 4 days after receiving liposomal bupivacaine unless your anesthesiologist approves.      Post Operative Instructions: Regional Anesthetic for Upper Extremity with Liposomal Bupivacaine  General Information:   Regional anesthesia is when local anesthetic or  numbing  medication is injected around the nerves to anesthetize or  numb  the area supplied by that set of nerves. It is a type of analgesia used to control pain and decreases the need for narcotics following surgery.    Types of Regional Blocks:  Interscalene: A block injected into the neck on the operative shoulder/arm of a patient having shoulder surgery  Supraclavicular: A  block injected near the clavicle on the operative shoulder of a patient having elbow, forearm, or hand surgery    Procedure:  The type of anesthesia your doctor used to numb your shoulder or arm will usually not start to wear off for 24-48 hours, but may last as long as 72 hours. You should be careful during that period, since it is possible to injure your arm without being aware of the injury. While your arm is numb, you should:    Avoid striking or bumping your arm    Avoid extreme hot or cold    Diet:  There are no restrictions on your diet. You should drink plenty of fluids.     Discomfort:  You will have a tingling and prickly sensation in your arm as the feeling begins to return. You can also expect some discomfort. The amount of discomfort is unpredictable, but if you have more pain than can be controlled with pain medication you should notify your physician.     Pain Medications:  Begin taking your oral pain pills before bedtime and during the night to avoid a sudden onset of pain as part of the block wears off.  Do not engage in drinking, driving, or hazardous occupations while taking pain medication.     Stitches:   You may have stitches or special skin closures. You doctor will inform you when to return to the office to have them removed.     Activity:  On the day of surgery you should try to stay in bed with your hand elevated on pillows. You may resume your normal activity after that, wearing a sling for comfort. Contact your physician if you have any of the problems:     Continued numbness or tingling in the arm or hand after 72 hours    Swelling of the fingers or fingers that are cold to the touch    Excessive bleeding or drainage    Severe pain  [facility]  Home Care Following Anesthesia  For 24 hours after surgery:  1. Get plenty of rest.  A responsible adult must stay with you for at least 24 hours after you leave the surgery center.  2. Do not drive or use  "heavy equipment.  If you have weakness or tingling, don't drive or use heavy equipment until this feeling goes away.   3. Do not drink alcohol.   4. Avoid strenuous or risky activities.  Ask for help when climbing stairs.  5. You may feel lightheaded.  IF so, sit for a few minutes before standing.  Have someone help you get up.   6. If you have nausea (feel sick to your stomach): Drink only clear liquids such as apple juice, ginger ale, broth or 7-Up.  Rest may also help.  Be sure to drink enough fluids.  Move to a regular diet as you feel able.   7. You may have a slight fever.  Call the doctor if your fever is over 100 F (37.7 C) (taken under the tongue) or lasts longer than 24 hours.  8. You may have a dry mouth, a sore throat, muscle aches or trouble sleeping. These should go away after 24 hours.  9. Do not make important or legal decisions.   If you use hormonal birth control (such as the pill, patch, ring or implants):  You will need a second form of birth control for 7 days (condoms, a diaphragm or contraceptive foam).  While in the surgery center, you received a medicine called Sugammadex.  Hormonal birth control (such as the pill, patch, ring or implants) will not work as well for a week after taking this medicine.  Today you received a Marcaine or bupivacaine block to numb the nerves near your surgery site.  This is a block using local anesthetic or \"numbing\" medication injected around the nerves to anesthetize or \"numb\" the area supplied by those nerves.  This block is injected into the muscle layer near your surgical site.  The medication may numb the location where you had surgery for 6-18 hours, but may last up to 24 hours.  If your surgical site is an arm or leg you should be careful with your affected limb, since it is possible to injure your limb without being aware of it due to the numbing.  Until full feeling returns, you should guard against bumping or hitting your limb, and avoid extreme hot or " cold temperatures on the skin.  As the block wears off, the feeling will return as a tingling or prickly sensation near your surgical site.  You will experience more discomfort from your incision as the feeling returns.  You may want to take a pain pill (a narcotic or Tylenol if this was prescribed by your surgeon) when you start to experience mild pain before the pain beccomes more severe.  If your pain medications do not control your pain you should notifiy your surgeon.    Tips for taking pain medications  To get the best pain relief possible, remember these points:    Take pain medications as directed, before pain becomes severe.    Pain medication can upset your stomach: taking it with food may help.    Constipation is a common side effect of pain medication. Drink plenty of  fluids.    Eat foods high in fiber. Take a stool softener if recommended by your doctor or pharmacist.    Do not drink alcohol, drive or operate machinery while taking pain medications.    Ask about other ways to control pain, such as with heat, ice or relaxation.    Tylenol/Acetaminophen Consumption  To help encourage the safe use of acetaminophen, the makers of TYLENOL  have lowered the maximum daily dose for single-ingredient Extra Strength TYLENOL  (acetaminophen) products sold in the [place] from 8 pills per day (4,000 mg) to 6 pills per day (3,000 mg). The dosing interval has also changed from 2 pills every 4-6 hours to 2 pills every 6 hours.    If you feel your pain relief is insufficient, you may take Tylenol/Acetaminophen in addition to your narcotic pain medication.     Be careful not to exceed 3,000 mg of Tylenol/Acetaminophen in a 24 hour period from all sources.    If you are taking extra strength Tylenol/acetaminophen (500 mg), the maximum dose is 6 tablets in 24 hours.    If you are taking regular strength acetaminophen (325 mg), the maximum dose is 9 tablets in 24 hours.    Call a doctor for any of the followin. Signs of  infection (fever, growing tenderness at the surgery site, a large amount of drainage or bleeding, severe pain, foul-smelling drainage, redness, swelling).  2. It has been over 8 to 10 hours since surgery and you are still not able to urinate (pass water).  3. Headache for over 24 hours.  4. Numbness, tingling or weakness the day after surgery (if you had spinal anesthesia).  Your doctor is:       Dr. Ezio Yin, Orthopaedics: [phone]               Or dial [phone] and ask for the resident on call for:  Orthopaedics  For emergency care, call the:  [facility] [place] Emergency Department: [phone] (TTY for hearing impaired: [phone])

## 2018-05-15 NOTE — ANESTHESIA POSTPROCEDURE EVALUATION
Patient: Karen Aguayo    Procedure(s):  Open Reduction Internal Fixation Right Distal Radius Fracture  - Wound Class: I-Clean    Diagnosis:Right Distal Radius Fracture  Diagnosis Additional Information: No value filed.    Anesthesia Type:  MAC, Periph. Nerve Block for postop pain, Peripheral Nerve Block    Note:  Anesthesia Post Evaluation    Patient location during evaluation: bedside  Patient participation: Able to participate in evaluation but full recovery from regional anesthesia has not yet occurred and is not anticipated to occur within 48 hours  Level of consciousness: awake  Pain management: adequate  Airway patency: patent  Cardiovascular status: acceptable  Respiratory status: acceptable  Hydration status: acceptable  PONV: controlled     Anesthetic complications: None          Last vitals:  Vitals:    05/15/18 1055 05/15/18 1104 05/15/18 1126   BP: 107/68 112/65 105/62   Resp: 17 16 14   Temp: 36.7  C (98  F) 37  C (98.6  F)    SpO2: 95% 96%          Electronically Signed By: Kolton Hernandez MD, MD  May 15, 2018  11:51 AM

## 2018-05-15 NOTE — OP NOTE
PREOPERATIVE DIAGNOSIS:  Right distal radius fracture.      POSTOPERATIVE DIAGNOSIS:  Right distal radius fracture.      PROCEDURE:  Open reduction internal fixation right distal radius fracture with > 3 fragments      ATTENDING SURGEON:  Ezio Yin MD      ASSISTANT:  Jackeline Pabon, PGY4.      ANESTHESIA:  Monitored anesthesia care with regional block, then conversion to general anesthesia due to inadequacy of block    TOURNIQUET TIME:   92 minutes     ESTIMATED BLOOD LOSS:  15 mL     SPECIMENS:  None.      DRAINS:  None.      IMPLANTS:  Synthes distal radius volar locking plate, 7 hole. Nonlocking screws proximally. 1 nonlocking and 6 locking screws distally.     COMPLICATIONS:  None apparent.      BRIEF HISTORY:  Karen Aguayo is a 22 year old-year-old female who presented after having sustained right distal radius fracture. This had been splinted in sports walk-in clinic. There was also concern for a concomitant scapholunate ligament injury. After extensive discussion, the patient wished to proceed with open reduction internal fixation of the distal radius fracture with nonoperative management of the scapholunate ligament, injury. I discussed the risks, benefits, and alternatives with her by telephone and then again with her and her family in the preoperative area. She and her family expressed understanding and elected to proceed with the above procedure.     INTRAOPERATIVE FINDINGS: Comminuted volar Olmos's fracture of the distal radius. Large displaced lunate facet fragment, in cortical continuity with shaft proximally and ulnarly but with significant plastic deformation contributing to substantial pronation of fracture fragment. Radial styloid fragment. Depressed scaphoid facet articular fragment. Volar cortical fragment overlying depressed scaphoid facet articular fragment, lying between styloid fragment and lunate facet fragment.     DESCRIPTION OF PROCEDURE:  The patient was identified in the  preoperative holding area.  The informed consent was reviewed. The operative site was identified and marked. A regional block was performed by the anesthesiologist. The patient was then brought to the operating room and positioned with the operative extremity over a hand table. A tourniquet was placed around the upper arm. Preoperative prophylactic antibiotics were administered. The operative arm was prepped and draped in a standard sterile fashion.  A timeout was performed, verifying the correct patient, procedure to be performed, and operative site. The arm was exsanguinated and the tourniquet was inflated to 250 mmHg. A longitudinal incision was made overlying the FCR tendon. The patient was responsive and so the wound was covered and progress was held while she was converted to general anesthesia. Once this was accomplished, incision was completed and the FCR tendon sheath was opened and FCR was retracted ulnarly. The floor of the FCR sheath was then opened and FPL was identified. FPL was mobilized and retracted ulnarly. Protator quadratus was identified. It was released off the radius at its radial insertion, elevated subperiosteally, and retracted ulnarly. Care was taken to maintain the integrity of the volar wrist capsule. Brachioradialis was released off of the radius, taking care to protect the tendons of the first dorsal wrist compartment.     The fracture site was then inspected and cleaned with a curette and dental pick. There was a depressed articular fragment of the scaphoid facet lying directly behind a volar cortical fragment just radial to the lunate facet fragment. Crushed allograft cancellous chips were impacted into place beneath this depressed fragment and tamped into position, elevating this piece up to the appropriate level. The volar cortical piece was then reduced over the top of this and a bone tamp was used to reduce the lunate facet piece back into position. X-rays were obtained and  showed a good reduction including good elevation of the previously depressed articular fragment.     A plate was then selected. A wide plate was chosen to allow fixation of both lunate facet and styloid. The plate was secured to the bone with a screw through the proximal oblong hole. The plate buttressed the fracture fragments and held them in a reduced position. A distal nonlocking screw was placed, bringing the plate completely down to bone. The plate was then secured proximally with nonlocking screws and distally with locking screws.  Reduction was checked with direct visualization and with fluoroscopy and was felt to be acceptable. Fluoroscopy was also used to confirm that plate and screw position and screw length were correct and to verify that all screws were extra-articular. Wrist was ranged and there was no mechanical block to pronation, supination, flexion or extension.  I stressed the DRUJ in neutral, supination, and pronation and it was stable. I then inspected the scapholunate interval fluoroscopically. The was no DISI deformity or static SL widening. There was mild widening with ulnar deviation.        The wound was irrigated copiously. The pronator quadratus was repaired to the brachioradialis tendon with 3-0 vicryl figure-of-eight sutures. The tourniquet was released and hemostasis was achieved with a bipolar. The wound was irrigated one more time. The skin was closed with 4-0 vicryl inverted interrupted sutures followed by a 4-0 running subcuticular monocryl stitch. A sterile dressing was applied of steristrips and sterile cast padding. The patient was then placed in a volar slab plaster splint and was then awoken and brought to the recovery room in stable condition. The patient tolerated the procedure well and there were no immediate complications. All sponge and needle counts were correct at the end of the case.      POSTOPERATIVE PLAN:  The patient will be discharged to home today with oral pain  medications . She has been instructed to take Vitamin C 500 mg orally daily for 2 months to reduce the risk of CRPS.  The patient will elevate and ice. The patient will return to clinic in 8 days to be placed in a zipper splint. She can begin range of motion with hand therapy at that time.

## 2018-05-15 NOTE — ANESTHESIA PREPROCEDURE EVALUATION
Anesthesia Evaluation     . Pt has had prior anesthetic. Type: MAC    No history of anesthetic complications          ROS/MED HX    ENT/Pulmonary:  - neg pulmonary ROS     Neurologic:  - neg neurologic ROS     Cardiovascular:  - neg cardiovascular ROS       METS/Exercise Tolerance:  4 - Raking leaves, gardening   Hematologic:  - neg hematologic  ROS       Musculoskeletal:   (+) fracture upper extremity, , -       GI/Hepatic:  - neg GI/hepatic ROS       Renal/Genitourinary:  - ROS Renal section negative       Endo:  - neg endo ROS       Psychiatric:  - neg psychiatric ROS       Infectious Disease:  - neg infectious disease ROS       Malignancy:         Other:                     Physical Exam  Normal systems: dental    Airway   Mallampati: I  TM distance: >3 FB  Neck ROM: full    Dental     Cardiovascular   Rhythm and rate: regular and normal      Pulmonary    breath sounds clear to auscultation                    Anesthesia Plan      History & Physical Review  History and physical reviewed and following examination; no interval change.    ASA Status:  1 .    NPO Status:  > 6 hours    Plan for MAC, Periph. Nerve Block for postop pain and Peripheral Nerve Block with Intravenous induction. Maintenance will be TIVA.  Reason for MAC:  Chronic cardiopulmonary disease (G9) and Deep or markedly invasive procedure (G8)  PONV prophylaxis:  Ondansetron (or other 5HT-3) and Dexamethasone or Solumedrol  Discussed with Patient Off-Label use of Liposomal Bupivacaine (Exparel) for Nerve Block.    Relevant risks & benefits were discussed with patient.    All questions were answered and there was agreement to proceed.    Patient signed Off-Label Use of Exparel Consent Form.          Postoperative Care  Postoperative pain management:  Oral pain medications, Multi-modal analgesia and Peripheral nerve block (Single Shot).      Consents  Anesthetic plan, risks, benefits and alternatives discussed with:  Patient..                           .

## 2018-05-15 NOTE — PROGRESS NOTES
Right SC block with exparel completed. Timeout completed. 1mg versed, 50 mcg fentanyl given. Pt tolerated well. VSS. 3 LPM NC. EKG monitored. Ultrasound guided.

## 2018-05-16 ENCOUNTER — TELEPHONE (OUTPATIENT)
Dept: ORTHOPEDICS | Facility: CLINIC | Age: 23
End: 2018-05-16

## 2018-05-16 NOTE — TELEPHONE ENCOUNTER
Returned patient's call regarding traveling after surgery.  She s/p ORIF distal radius fracture with Dr. Yin on 5/15/2018.  She had travel plans to go to [place] on 5/23/18.  She is scheduled to see us and hand therapy on this day.  Dr. Yin is recommending that patient does nto travel within 2 weeks of surgery.  She would then normally come back to clinic at 4 weeks post op and Dr. Yin is ok with her traveling during the latter 2 weeks.  Patient was notified of this.  She will look into changing her flights.  If she needs an MD note stating this we would be happy to do that for her.    Her appt on 5/23/18 was changed per her request to earlier in the day.

## 2018-05-22 ENCOUNTER — PRE VISIT (OUTPATIENT)
Dept: ORTHOPEDICS | Facility: CLINIC | Age: 23
End: 2018-05-22

## 2018-05-22 DIAGNOSIS — S52.501A CLOSED FRACTURE OF DISTAL END OF RIGHT RADIUS: Primary | ICD-10-CM

## 2018-05-22 NOTE — TELEPHONE ENCOUNTER
Patient is s/p right ORIF distal radius fracture on 5/15/18.    Patient has not been seen postoperatively.    Patient is coming to clinic for 1 week post-op visit.      Per standing orders, right wrist xrays out of splint have been ordered and scheduled.     Patient visit type and questionnaires have been reviewed and are correct for this appointment? Yes     Hand therapy: 8:30 am    Britany Aquino ATC

## 2018-05-23 ENCOUNTER — RADIANT APPOINTMENT (OUTPATIENT)
Dept: GENERAL RADIOLOGY | Facility: CLINIC | Age: 23
End: 2018-05-23
Attending: ORTHOPAEDIC SURGERY
Payer: COMMERCIAL

## 2018-05-23 ENCOUNTER — THERAPY VISIT (OUTPATIENT)
Dept: OCCUPATIONAL THERAPY | Facility: CLINIC | Age: 23
End: 2018-05-23
Payer: COMMERCIAL

## 2018-05-23 ENCOUNTER — OFFICE VISIT (OUTPATIENT)
Dept: ORTHOPEDICS | Facility: CLINIC | Age: 23
End: 2018-05-23
Payer: COMMERCIAL

## 2018-05-23 VITALS — BODY MASS INDEX: 21.5 KG/M2 | WEIGHT: 137 LBS | HEIGHT: 67 IN

## 2018-05-23 DIAGNOSIS — Z47.89 AFTERCARE FOLLOWING SURGERY OF THE MUSCULOSKELETAL SYSTEM: ICD-10-CM

## 2018-05-23 DIAGNOSIS — S52.501A CLOSED FRACTURE OF DISTAL END OF RIGHT RADIUS: ICD-10-CM

## 2018-05-23 DIAGNOSIS — M25.631 WRIST STIFFNESS, RIGHT: ICD-10-CM

## 2018-05-23 DIAGNOSIS — S52.501D CLOSED FRACTURE OF DISTAL END OF RIGHT RADIUS WITH ROUTINE HEALING, UNSPECIFIED FRACTURE MORPHOLOGY, SUBSEQUENT ENCOUNTER: Primary | ICD-10-CM

## 2018-05-23 DIAGNOSIS — M79.641 PAIN OF RIGHT HAND: Primary | ICD-10-CM

## 2018-05-23 PROCEDURE — 97140 MANUAL THERAPY 1/> REGIONS: CPT | Mod: GO | Performed by: OCCUPATIONAL THERAPIST

## 2018-05-23 PROCEDURE — 97110 THERAPEUTIC EXERCISES: CPT | Mod: GO | Performed by: OCCUPATIONAL THERAPIST

## 2018-05-23 PROCEDURE — 97760 ORTHOTIC MGMT&TRAING 1ST ENC: CPT | Mod: GO | Performed by: OCCUPATIONAL THERAPIST

## 2018-05-23 PROCEDURE — 97165 OT EVAL LOW COMPLEX 30 MIN: CPT | Mod: GO | Performed by: OCCUPATIONAL THERAPIST
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
## 2018-05-23 NOTE — LETTER
Date:May 24, 2018      Patient was self referred, no letter generated. Do not send.        [facility] Health Information

## 2018-05-23 NOTE — MR AVS SNAPSHOT
After Visit Summary   2018    Karen Aguayo    MRN: [id number]           Patient Information     Date Of Birth          1995        Visit Information        Provider Department      2018 8:15 AM Ezio Yin MD [facility]        Today's Diagnoses     Closed fracture of distal end of right radius with routine healing, unspecified fracture morphology, subsequent encounter    -  1       Follow-ups after your visit        Additional Services     HAND THERAPY       Hand Therapy Referral   Please fabricate a right zipper splint, remove only for exercise, NWB, AROM right wrist and digits.                  Your next 10 appointments already scheduled     2018  1:30 PM CDT   (Arrive by 1:15 PM)   RETURN HAND with Ezio Yin MD   [facility] ([facility])    [address] [place] [id number] [phone]              Who to contact     Please call your clinic at [phone] to:    Ask questions about your health    Make or cancel appointments    Discuss your medicines    Learn about your test results    Speak to your doctor            Additional Information About Your Visit        MyChart Information     [employer] is an electronic gateway that provides easy, online access to your medical records. With [employer], you can request a clinic appointment, read your test results, renew a prescription or communicate with your care team.     To sign up for [employer] visit the website at [email or web address]   You will be asked to enter the access code listed below, as well as some personal information. Please follow the directions to create your username and password.     Your access code is: [email or web address]  Expires: 2018  5:00 PM     Your access code will  in 90 days. If you need help or a new code, please contact your [facility] or call [phone] for  "assistance.        Care EveryWhere ID     This is your Care EveryWhere ID. This could be used by other organizations to access your [place] medical records  [id number]        Your Vitals Were     Height BMI (Body Mass Index)                1.702 m (5' 7\") 21.46 kg/m2           Blood Pressure from Last 3 Encounters:   05/15/18 105/62   05/04/18 106/74    Weight from Last 3 Encounters:   05/23/18 62.1 kg (137 lb)   05/15/18 62.4 kg (137 lb 8 oz)   05/09/18 61.4 kg (135 lb 6.4 oz)              We Performed the Following     HAND THERAPY        Primary Care Provider    None Specified       No primary provider on file.        Equal Access to Services     NICKY LANG : Seema Cummings, juan garibay, owen torres, tate bolaños . So [facility] [phone].    ATENCIÓN: Si habla español, tiene a vallejo disposición servicios gratuitos de asistencia lingüística. Llame al [phone].    We comply with applicable federal civil rights laws and [place] laws. We do not discriminate on the basis of race, color, national origin, age, disability, sex, sexual orientation, or gender identity.            Thank you!     Thank you for choosing [facility]  for your care. Our goal is always to provide you with excellent care. Hearing back from our patients is one way we can continue to improve our services. Please take a few minutes to complete the written survey that you may receive in the mail after your visit with us. Thank you!             Your Updated Medication List - Protect others around you: Learn how to safely use, store and throw away your medicines at www.disposemymeds.org.          This list is accurate as of 5/23/18  8:31 AM.  Always use your most recent med list.                   Brand Name Dispense Instructions for use Diagnosis    oxyCODONE-acetaminophen 5-325 MG per tablet    PERCOCET    30 tablet    Take 1-2 tablets by mouth every 4 hours as needed " for pain (moderate to severe)    Closed Olmos's fracture of right radius, initial encounter       senna-docusate 8.6-50 MG per tablet    SENOKOT-S;PERICOLACE    30 tablet    Take 1-2 tablets by mouth 2 times daily Take while on oral narcotics to prevent or treat constipation.    Closed Olmos's fracture of right radius, initial encounter
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
## 2018-05-23 NOTE — LETTER
"5/23/2018       RE: Karen Aguayo  [address]  [place] [address]     Dear Colleague,    Thank you for referring your patient, Karen Aguayo, to the [facility] at [facility]. Please see a copy of my visit note below.    Date of Service: May 23, 2018    Reason for visit: Postoperative follow-up    Date of Surgery: May 15, 2018    Procedure Performed: Open reduction internal fixation  right distal radius fracture    Interval events: Karen Aguayo comes to see me in follow-up today from the above surgery. IS NOT still taking narcotic medication for pain. No fevers or chills. No numbess or tingling. No other complaints today.     Physical examination:  Height 1.702 m (5' 7\"), weight 62.1 kg (137 lb).    Well-developed, well-nourished and in no acute distress.  Alert and oriented to surroundings.  On examination of the right wrist, incision is clean, dry and intact. There is no erythema, drainage, or dehiscence. Sensation is intact in median, radial and ulnar nerve distributions. Thumb opposition is intact. Patient can actively flex and extend all digits and thumb. Interosseous muscles, EPL, and FDP-2 fire. Fingers are warm and well-perfused.     Radiographs: Three views of the right wrist were obtained and reviewed. These demonstrate no changes in hardware or bony alignment.     Assessment: Progressing appropriately following the above procedure    Plan:  The patient will be seen by hand therapy today and fitted for a custom zipper splint. The patient may begin gentle AROM of the wrist in all planes. The splint is to be worn at all times except for exercise and showers. The patient is to do no weight-bearing. The patient may wash the incision with soap and water daily and pat it dry but should avoid any prolonged soaking such as dish-washing, tub baths, or swimming until the skin is completely healed (usually around the 3-4 week shirin). The steristrips " covering the wound may be allowed to fall off on their own. I will see the patient back for a postoperative visit at 4 weeks postop. Knows to contact us in the interim if any questions, concerns, or other issues arise. In the interim, she is going to go to [place]. We discussed the risks and benefits of this, that there will be difficulties obtaining care over there should any issues arise, what the issues my be that would arise for which she would need to seek prompt care, that she will need to elevate and do no weight-bearing with the surgical extremity whatsoever, and that most people would do hand therapy twice a week at this point and so she will have to do her exercises at home instead. She is only going for 2 weeks and I think this is reasonable.     Again, thank you for allowing me to participate in the care of your patient.      Sincerely,    Ezio Yin MD

## 2018-05-23 NOTE — PROGRESS NOTES
"Date of Service: May 23, 2018    Reason for visit: Postoperative follow-up    Date of Surgery: May 15, 2018    Procedure Performed: Open reduction internal fixation  right distal radius fracture    Interval events: Karen Aguayo comes to see me in follow-up today from the above surgery. IS NOT still taking narcotic medication for pain. No fevers or chills. No numbess or tingling. No other complaints today.     Physical examination:  Height 1.702 m (5' 7\"), weight 62.1 kg (137 lb).    Well-developed, well-nourished and in no acute distress.  Alert and oriented to surroundings.  On examination of the right wrist, incision is clean, dry and intact. There is no erythema, drainage, or dehiscence. Sensation is intact in median, radial and ulnar nerve distributions. Thumb opposition is intact. Patient can actively flex and extend all digits and thumb. Interosseous muscles, EPL, and FDP-2 fire. Fingers are warm and well-perfused.     Radiographs: Three views of the right wrist were obtained and reviewed. These demonstrate no changes in hardware or bony alignment.     Assessment: Progressing appropriately following the above procedure    Plan:  The patient will be seen by hand therapy today and fitted for a custom zipper splint. The patient may begin gentle AROM of the wrist in all planes. The splint is to be worn at all times except for exercise and showers. The patient is to do no weight-bearing. The patient may wash the incision with soap and water daily and pat it dry but should avoid any prolonged soaking such as dish-washing, tub baths, or swimming until the skin is completely healed (usually around the 3-4 week shirin). The steristrips covering the wound may be allowed to fall off on their own. I will see the patient back for a postoperative visit at 4 weeks postop. Knows to contact us in the interim if any questions, concerns, or other issues arise. In the interim, she is going to go to [place]. We discussed the risks " and benefits of this, that there will be difficulties obtaining care over there should any issues arise, what the issues my be that would arise for which she would need to seek prompt care, that she will need to elevate and do no weight-bearing with the surgical extremity whatsoever, and that most people would do hand therapy twice a week at this point and so she will have to do her exercises at home instead. She is only going for 2 weeks and I think this is reasonable.

## 2018-05-23 NOTE — MR AVS SNAPSHOT
After Visit Summary   5/23/2018    Karen Aguayo    MRN: [id number]           Patient Information     Date Of Birth          1995        Visit Information        Provider Department      5/23/2018 8:30 AM Ernie Blum OT [facility] Hand Therapy        Today's Diagnoses     Pain of right hand    -  1    Wrist stiffness, right        Aftercare following surgery of the musculoskeletal system           Follow-ups after your visit        Your next 10 appointments already scheduled     Jun 07, 2018  4:00 PM CDT   ALEXANDRA Hand with Ernie Blum OT   [facility] Hand Therapy ([facility])    [address] [place] [id number] [phone]            Jun 13, 2018  1:30 PM CDT   (Arrive by 1:15 PM)   RETURN HAND with Ezio Yin MD   [facility] ([facility])    [address] [place] [id number] [phone]            Jun 13, 2018  2:30 PM CDT   ALEXANDRA Hand with Nicole Reid OT   [facility] Hand Therapy ([facility])    [address] [place] [id number] [phone]              Who to contact     If you have questions or need follow up information about today's clinic visit or your schedule please contact [facility] directly at [phone].  Normal or non-critical lab and imaging results will be communicated to you by MyChart, letter or phone within 4 business days after the clinic has received the results. If you do not hear from us within 7 days, please contact the clinic through MyChart or phone. If you have a critical or abnormal lab result, we will notify you by phone as soon as possible.  Submit refill requests through [employer] or call your pharmacy and they will forward the refill request to us. Please allow 3 business days for your refill to be completed.          Additional Information About Your Visit       "  MyChart Information     [facility] lets you send messages to your doctor, view your test results, renew your prescriptions, schedule appointments and more. To sign up, go to [email or web address] . Click on \"Log in\" on the left side of the screen, which will take you to the Welcome page. Then click on \"Sign up Now\" on the right side of the page.     You will be asked to enter the access code listed below, as well as some personal information. Please follow the directions to create your username and password.     Your access code is: [email or web address]  Expires: 2018  5:00 PM     Your access code will  in 90 days. If you need help or a new code, please call your [place] clinic or [phone].        Care EveryWhere ID     This is your Care EveryWhere ID. This could be used by other organizations to access your [place] medical records  [id number]         Blood Pressure from Last 3 Encounters:   05/15/18 105/62   18 106/74    Weight from Last 3 Encounters:   18 62.1 kg (137 lb)   05/15/18 62.4 kg (137 lb 8 oz)   18 61.4 kg (135 lb 6.4 oz)              We Performed the Following     HC OT EVAL, LOW COMPLEXITY     MANUAL THER TECH,1+REGIONS,EA 15 MIN     ORTHOTIC MGMT AND TRAINING, EACH 15 MIN     THERAPEUTIC EXERCISES        Primary Care Provider    None Specified       No primary provider on file.        Equal Access to Services     [facility]: Hadii lizzie ku hadzeniao Soheathali, waaxda luqadaha, qaybta kaalmada tariqyada, tate bolaños . So [facility] [phone].    ATENCIÓN: Si habla español, tiene a vallejo disposición servicios gratuitos de asistencia lingüística. Llame al [phone].    We comply with applicable federal civil rights laws and [place] laws. We do not discriminate on the basis of race, color, national origin, age, disability, sex, sexual orientation, or gender identity.            Thank you!     Thank you for choosing [facility] THER[facility]  for your " care. Our goal is always to provide you with excellent care. Hearing back from our patients is one way we can continue to improve our services. Please take a few minutes to complete the written survey that you may receive in the mail after your visit with us. Thank you!             Your Updated Medication List - Protect others around you: Learn how to safely use, store and throw away your medicines at www.disposemymeds.org.          This list is accurate as of 5/23/18 11:17 AM.  Always use your most recent med list.                   Brand Name Dispense Instructions for use Diagnosis    oxyCODONE-acetaminophen 5-325 MG per tablet    PERCOCET    30 tablet    Take 1-2 tablets by mouth every 4 hours as needed for pain (moderate to severe)    Closed Olmos's fracture of right radius, initial encounter       senna-docusate 8.6-50 MG per tablet    SENOKOT-S;PERICOLACE    30 tablet    Take 1-2 tablets by mouth 2 times daily Take while on oral narcotics to prevent or treat constipation.    Closed Olmos's fracture of right radius, initial encounter

## 2018-06-07 ENCOUNTER — THERAPY VISIT (OUTPATIENT)
Dept: OCCUPATIONAL THERAPY | Facility: CLINIC | Age: 23
End: 2018-06-07
Payer: COMMERCIAL

## 2018-06-07 DIAGNOSIS — M79.641 PAIN OF RIGHT HAND: ICD-10-CM

## 2018-06-07 DIAGNOSIS — M25.631 WRIST STIFFNESS, RIGHT: ICD-10-CM

## 2018-06-07 DIAGNOSIS — Z47.89 AFTERCARE FOLLOWING SURGERY OF THE MUSCULOSKELETAL SYSTEM: ICD-10-CM

## 2018-06-07 PROCEDURE — 97140 MANUAL THERAPY 1/> REGIONS: CPT | Mod: GO | Performed by: OCCUPATIONAL THERAPIST

## 2018-06-07 PROCEDURE — 97110 THERAPEUTIC EXERCISES: CPT | Mod: GO | Performed by: OCCUPATIONAL THERAPIST

## 2018-06-07 PROCEDURE — 97763 ORTHC/PROSTC MGMT SBSQ ENC: CPT | Mod: GO | Performed by: OCCUPATIONAL THERAPIST

## 2018-06-07 NOTE — PROGRESS NOTES
Hand Therapy - Objective Information    Current Date:  6/7/2018  Diagnosis: R distal radius fracture  Onset: 5/4/18  MD order: 5/23/18  Procedure: s/p R ORIF  DOS: 5/15/2018  Post:  3w2d          Objective:  Edema:  Circumference:  (measured in cm)  Wrist 5/23/2018 6/7/18   Right 16.8 16.4   Left 15.2      Wound/Scar: slight tenderness with healing incision, minimal scabs present    ROM:  Wrist  5/23/2018 6/7/18   AROM(PROM) Left Right Right   Extension 84 19 45   Flexion 83 28 46   RD 14 0 10   UD 37 15 29   Supination 98 0 35   Pronation 88 80 78   Pt is able to make a full tight fist    Please refer to the daily flowsheet for treatment provided today.

## 2018-06-07 NOTE — MR AVS SNAPSHOT
"              After Visit Summary   6/7/2018    Karen Aguayo    MRN: [id number]           Patient Information     Date Of Birth          1995        Visit Information        Provider Department      6/7/2018 4:00 PM Ernie Blum OT [facility]        Today's Diagnoses     Pain of right hand        Wrist stiffness, right        Aftercare following surgery of the musculoskeletal system           Follow-ups after your visit        Your next 10 appointments already scheduled     Jun 13, 2018  1:30 PM CDT   (Arrive by 1:15 PM)   RETURN HAND with Ezio Yin MD   [facility] ([facility])    [address] [place] [address] [phone]            Jun 13, 2018  2:30 PM CDT   ALEXANDRA Hand with Nicole Reid OT   [facility] ([facility])    [address] [place] [address] [phone]              Who to contact     If you have questions or need follow up information about today's clinic visit or your schedule please contact [facility] directly at [phone].  Normal or non-critical lab and imaging results will be communicated to you by [employer] Marketshart, letter or phone within 4 business days after the clinic has received the results. If you do not hear from us within 7 days, please contact the clinic through DoubleDutcht or phone. If you have a critical or abnormal lab result, we will notify you by phone as soon as possible.  Submit refill requests through Celnyx or call your pharmacy and they will forward the refill request to us. Please allow 3 business days for your refill to be completed.          Additional Information About Your Visit        [employer] Marketshart Information     Celnyx lets you send messages to your doctor, view your test results, renew your prescriptions, schedule appointments and more. To sign up, go to [email or web address] . Click on \"Log in\" on " "the left side of the screen, which will take you to the Welcome page. Then click on \"Sign up Now\" on the right side of the page.     You will be asked to enter the access code listed below, as well as some personal information. Please follow the directions to create your username and password.     Your access code is: [email or web address]  Expires: 2018  5:00 PM     Your access code will  in 90 days. If you need help or a new code, please call your [place] clinic or [phone].        Care EveryWhere ID     This is your Care EveryWhere ID. This could be used by other organizations to access your [place] medical records  [id number]         Blood Pressure from Last 3 Encounters:   05/15/18 105/62   18 106/74    Weight from Last 3 Encounters:   18 62.1 kg (137 lb)   05/15/18 62.4 kg (137 lb 8 oz)   18 61.4 kg (135 lb 6.4 oz)              We Performed the Following     C OT ORTHOTICS/PROSTH MGMT &/TRAING SBSQ ENCTR, EA 15 MIN     MANUAL THER TECH,1+REGIONS,EA 15 MIN     THERAPEUTIC EXERCISES        Primary Care Provider Office Phone # Fax #    Ezio Lazaro Yin [phone]       [address] [facility] [address]        Equal Access to Services     INCKY LANG : Hadii aad ku hadasho Soheathali, waaxda luqadaha, qaybta kaalmada ijeomada, tate lee. So [facility] [phone].    ATENCIÓN: Si habla español, tiene a vallejo disposición servicios gratuitos de asistencia lingüística. Llame al [phone].    We comply with applicable federal civil rights laws and [place] laws. We do not discriminate on the basis of race, color, national origin, age, disability, sex, sexual orientation, or gender identity.            Thank you!     Thank you for choosing [facility]  for your care. Our goal is always to provide you with excellent care. Hearing back from our patients is one way we can continue to improve our services. Please take a few minutes " to complete the written survey that you may receive in the mail after your visit with us. Thank you!             Your Updated Medication List - Protect others around you: Learn how to safely use, store and throw away your medicines at www.disposemymeds.org.          This list is accurate as of 6/7/18  6:15 PM.  Always use your most recent med list.                   Brand Name Dispense Instructions for use Diagnosis    oxyCODONE-acetaminophen 5-325 MG per tablet    PERCOCET    30 tablet    Take 1-2 tablets by mouth every 4 hours as needed for pain (moderate to severe)    Closed Olmos's fracture of right radius, initial encounter       senna-docusate 8.6-50 MG per tablet    SENOKOT-S;PERICOLACE    30 tablet    Take 1-2 tablets by mouth 2 times daily Take while on oral narcotics to prevent or treat constipation.    Closed Olmos's fracture of right radius, initial encounter

## 2018-06-12 ENCOUNTER — HEALTH MAINTENANCE LETTER (OUTPATIENT)
Age: 23
End: 2018-06-12

## 2018-06-12 DIAGNOSIS — S52.501D CLOSED FRACTURE OF DISTAL END OF RIGHT RADIUS WITH ROUTINE HEALING: Primary | ICD-10-CM

## 2018-06-13 ENCOUNTER — OFFICE VISIT (OUTPATIENT)
Dept: ORTHOPEDICS | Facility: CLINIC | Age: 23
End: 2018-06-13
Payer: COMMERCIAL

## 2018-06-13 ENCOUNTER — THERAPY VISIT (OUTPATIENT)
Dept: OCCUPATIONAL THERAPY | Facility: CLINIC | Age: 23
End: 2018-06-13
Payer: COMMERCIAL

## 2018-06-13 ENCOUNTER — RADIANT APPOINTMENT (OUTPATIENT)
Dept: GENERAL RADIOLOGY | Facility: CLINIC | Age: 23
End: 2018-06-13
Attending: ORTHOPAEDIC SURGERY
Payer: COMMERCIAL

## 2018-06-13 VITALS — BODY MASS INDEX: 21.5 KG/M2 | HEIGHT: 67 IN | WEIGHT: 137 LBS

## 2018-06-13 DIAGNOSIS — S52.501D CLOSED FRACTURE OF DISTAL END OF RIGHT RADIUS WITH ROUTINE HEALING, UNSPECIFIED FRACTURE MORPHOLOGY, SUBSEQUENT ENCOUNTER: Primary | ICD-10-CM

## 2018-06-13 DIAGNOSIS — S52.501D CLOSED FRACTURE OF DISTAL END OF RIGHT RADIUS WITH ROUTINE HEALING: ICD-10-CM

## 2018-06-13 DIAGNOSIS — Z47.89 AFTERCARE FOLLOWING SURGERY OF THE MUSCULOSKELETAL SYSTEM: ICD-10-CM

## 2018-06-13 DIAGNOSIS — M25.631 WRIST STIFFNESS, RIGHT: ICD-10-CM

## 2018-06-13 DIAGNOSIS — M79.641 PAIN OF RIGHT HAND: ICD-10-CM

## 2018-06-13 PROCEDURE — 97140 MANUAL THERAPY 1/> REGIONS: CPT | Mod: GO | Performed by: OCCUPATIONAL THERAPIST

## 2018-06-13 PROCEDURE — 97110 THERAPEUTIC EXERCISES: CPT | Mod: GO | Performed by: OCCUPATIONAL THERAPIST

## 2018-06-13 NOTE — MR AVS SNAPSHOT
"              After Visit Summary   2018    Karen Aguayo    MRN: [id number]           Patient Information     Date Of Birth          1995        Visit Information        Provider Department      2018 2:30 PM Nicole Reid OT [facility]        Today's Diagnoses     Pain of right hand        Wrist stiffness, right        Aftercare following surgery of the musculoskeletal system           Follow-ups after your visit        Who to contact     If you have questions or need follow up information about today's clinic visit or your schedule please contact [facility] directly at [phone].  Normal or non-critical lab and imaging results will be communicated to you by Seeklyhart, letter or phone within 4 business days after the clinic has received the results. If you do not hear from us within 7 days, please contact the clinic through Seeklyhart or phone. If you have a critical or abnormal lab result, we will notify you by phone as soon as possible.  Submit refill requests through Flowdock or call your pharmacy and they will forward the refill request to us. Please allow 3 business days for your refill to be completed.          Additional Information About Your Visit        MyChart Information     Flowdock lets you send messages to your doctor, view your test results, renew your prescriptions, schedule appointments and more. To sign up, go to [email or web address] . Click on \"Log in\" on the left side of the screen, which will take you to the Welcome page. Then click on \"Sign up Now\" on the right side of the page.     You will be asked to enter the access code listed below, as well as some personal information. Please follow the directions to create your username and password.     Your access code is: [email or web address]  Expires: 2018  1:28 PM     Your access code will  in 90 days. If you need help or a new code, please call your [facility] clinic or [phone].        Care " EveryWhere ID     This is your Care EveryWhere ID. This could be used by other organizations to access your [place] medical records  [id number]         Blood Pressure from Last 3 Encounters:   05/15/18 105/62   05/04/18 106/74    Weight from Last 3 Encounters:   06/13/18 62.1 kg (137 lb)   05/23/18 62.1 kg (137 lb)   05/15/18 62.4 kg (137 lb 8 oz)              We Performed the Following     MANUAL THER TECH,1+REGIONS,EA 15 MIN     THERAPEUTIC EXERCISES        Primary Care Provider Office Phone # Fax #    Ezio Lazaro Yin [phone] [address] [facility] [address]        Equal Access to Services     NICKY LANG : Hadii lizzie dyer hadasho Soanuj, waaxda luqadaha, qaybta kaalmada adeegyada, tate lee. So [facility] [phone].    ATENCIÓN: Si habla español, tiene a vallejo disposición servicios gratuitos de asistencia lingüística. Llame al [phone].    We comply with applicable federal civil rights laws and [place] laws. We do not discriminate on the basis of race, color, national origin, age, disability, sex, sexual orientation, or gender identity.            Thank you!     Thank you for choosing [facility]  for your care. Our goal is always to provide you with excellent care. Hearing back from our patients is one way we can continue to improve our services. Please take a few minutes to complete the written survey that you may receive in the mail after your visit with us. Thank you!             Your Updated Medication List - Protect others around you: Learn how to safely use, store and throw away your medicines at www.disposemymeds.org.          This list is accurate as of 6/13/18 10:20 PM.  Always use your most recent med list.                   Brand Name Dispense Instructions for use Diagnosis    oxyCODONE-acetaminophen 5-325 MG per tablet    PERCOCET    30 tablet    Take 1-2 tablets by mouth every 4 hours as needed for pain (moderate to  severe)    Closed Olmos's fracture of right radius, initial encounter       senna-docusate 8.6-50 MG per tablet    SENOKOT-S;PERICOLACE    30 tablet    Take 1-2 tablets by mouth 2 times daily Take while on oral narcotics to prevent or treat constipation.    Closed Olmos's fracture of right radius, initial encounter

## 2018-06-13 NOTE — PROGRESS NOTES
SOAP note objective information for 6/13/2018.  ROM:  Wrist  5/23/2018 6/7/18 6/13/18   AROM(PROM) Left Right Right    Extension 84 19 45 58   Flexion 83 28 46 47   RD 14 0 10 15   UD 37 15 29 30   Supination 98 0 35 60   Pronation 88 80 78 80   Please refer to the daily flowsheet for treatment today, total treatment time and time spent performing 1:1 timed codes.       Home Program:   Scar mobilization  AROM of wrist and forearm all planes  Tendon gliding  Gripping  Zipper splint  Avoid activities that exacerbate pain-NWB     Next Visit:  Pt will be gone 2 weeks;   Orthosis adjustment PRN  MFR and scar  Progress motion

## 2018-06-13 NOTE — LETTER
"2018       RE: Karen Aguayo  [address]  [place] [address]     Dear Colleague,    Thank you for referring your patient, Karen Aguayo, to the [facility] at [facility]. Please see a copy of my visit note below.    [facility]  Orthopedics  Ezio Yin MD  2018     Name: Karen Aguayo  MRN: [id number]  Age: 22 year old  : 1995  Referring provider: Ezio Yin     Chief Complaint:   Surgical Followup (The patient is following up today after a right ORIF distal radius fracture. DOS: 5/15/18.)     History of Present Illness:   Karen Aguayo is a 22 year old female 1 month status-post open reduction internal fixation right distal radius fracture with > 3 fragments who presents for postoperative evaluation. I last evaluated the patient on 18 at which time she reported that she was no longer taking narcotic medication for pain and had no numbness or tingling. Today, she reports improvement with range of motion. She just got back from [place]. She did physical therapy exercises during her trip to [facility] over the past 2 weeks and has continued to use her brace most of the time, except with showering and while playing the piano 10 minutes per day. She notes minimal pain and denies numbness or tingling. Motion continues to improve. 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    Physical Examination:  Ht 1.702 m (5' 7\")  Wt 62.1 kg (137 lb)  BMI 21.46 kg/m2  General: Healthy appearing female. Affect appropriate. Normal gait. Alert and oriented to surroundings.   Right Upper Extremity: Radial pulses are palpable and symmetric. Sensation intact to light touch in all digits in median, radial and ulnar nerve distributions. Surgical incision(s) are healing well without evidence of induration, erythema, or drainage. Thumb opposition is intact. Patient can actively flex and " extend all digits and thumb. Interosseous muscles, EPL, and FDP-2 fire. No pain over SL interval. Fingers are warm and well-perfused. Wrist extension at 45 degrees, wrist flexion at 40 degrees, supination at 70 degrees, pronation at 90 degrees.    Radiographs:   Radiographs of the right wrist - 3 views (06/13/2018):  No changes in hardware or bony alignment. No DISI deformity. No change in SL widening.     I have independently reviewed the above imaging studies; the results were discussed with the patient.     Assessment:   22 year old female status-post open reduction internal fixation right distal radius fracture with > 3 fragments completed on 5/15/2018. Question of possible SL injury at time of initial injury.     Plan:   The patient reports that she is doing well with improved range of motion. We discussed limitations such as no lifting anything heavier than a coffee cup for the next 2 weeks. After two weeks she can discontinue using a brace and lift a maximum of 5 pounds. 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  She is doing very well so I am comfortable deferring this visit till her return in approximately 6 weeks. She will get new therapy exercises to do at home. We will hold of on strengthening until then. She will discontinue the brace entriely in two weeks and I will see her back in 6 weeks.       Scribe Disclosure:   I, Jeromy Forman, am serving as a scribe to document services personally performed by Ezio Yin MD at this visit, based upon the provider's statements to me. All documentation has been reviewed by the aforementioned provider prior to being entered into the official medical record.     Portions of this medical record were completed by a scribe. UPON MY REVIEW AND AUTHENTICATION BY ELECTRONIC SIGNATURE, this confirms (a) I performed the applicable clinical services, and (b) the record is  accurate.    Again, thank you for allowing me to participate in the care of your patient.      Sincerely,    Ezio Yin MD

## 2018-06-13 NOTE — NURSING NOTE
"Reason For Visit:   Chief Complaint   Patient presents with     Surgical Followup     The patient is following up today after a right ORIF distal radius fracture. DOS: 5/15/18.       Primary MD: Ezio Yin  Ref. MD: Established    ?  No    Age: 22 year old    Date of surgery: 5/15/18  Type of surgery: ORIF right distal radius fracture.        Ht 1.702 m (5' 7\")  Wt 62.1 kg (137 lb)  BMI 21.46 kg/m2      Pain Assessment  Patient Currently in Pain: Denies    Hand Dominance Evaluation  Hand Dominance: Right          QuickDASH Assessment  QuickDASH Main 6/13/2018   1.Open a tight or new jar. Mild difficulty   2. Do heavy household chores (e.g., wash walls, floors) Mild difficulty   3. Carry a shopping bag or briefcase. Mild difficulty   4. Wash your back. Mild difficulty   5. Use a knife to cut food. Moderate difficulty   6. Recreational activities in which you take some force or impact through your arm, shoulder or hand (e.g., golf, hammering, tennis, etc.). Moderate difficulty   7. During the past week, to what extent has your arm, shoulder or hand problem interfered with your normal social activities with family, friends, neighbours or groups? Slightly   8. During the past week, were you limited in your work or other regular daily activities as a result of your arm, shoulder or hand problem? Very limited   9. Arm, shoulder or hand pain. None   10.Tingling (pins and needles) in your arm,shoulder or hand. None   11. During the past week, how much difficulty have you had sleeping because of the pain in your arm, shoulder or hand? ([place] number) No difficulty   Quickdash Ability Score 27.27          No Known Allergies    Britany Aquino ATC  "

## 2018-06-13 NOTE — PROGRESS NOTES
"[facility]  Orthopedics  Ezio Yin MD  2018     Name: Karen Aguayo  MRN: [id number]  Age: 22 year old  : 1995  Referring provider: Ezio Yin     Chief Complaint:   Surgical Followup (The patient is following up today after a right ORIF distal radius fracture. DOS: 5/15/18.)     History of Present Illness:   Karen Aguayo is a 22 year old female 1 month status-post open reduction internal fixation right distal radius fracture with > 3 fragments who presents for postoperative evaluation. I last evaluated the patient on 18 at which time she reported that she was no longer taking narcotic medication for pain and had no numbness or tingling. Today, she reports improvement with range of motion. She just got back from [place]. She did physical therapy exercises during her trip to [facility] over the past 2 weeks and has continued to use her brace most of the time, except with showering and while playing the piano 10 minutes per day. She notes minimal pain and denies numbness or tingling. Motion continues to improve. 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    Physical Examination:  Ht 1.702 m (5' 7\")  Wt 62.1 kg (137 lb)  BMI 21.46 kg/m2  General: Healthy appearing female. Affect appropriate. Normal gait. Alert and oriented to surroundings.   Right Upper Extremity: Radial pulses are palpable and symmetric. Sensation intact to light touch in all digits in median, radial and ulnar nerve distributions. Surgical incision(s) are healing well without evidence of induration, erythema, or drainage. Thumb opposition is intact. Patient can actively flex and extend all digits and thumb. Interosseous muscles, EPL, and FDP-2 fire. No pain over SL interval. Fingers are warm and well-perfused. Wrist extension at 45 degrees, wrist flexion at 40 degrees, supination at 70 degrees, pronation at 90 degrees.    Radiographs:   Radiographs of " the right wrist - 3 views (06/13/2018):  No changes in hardware or bony alignment. No DISI deformity. No change in SL widening.     I have independently reviewed the above imaging studies; the results were discussed with the patient.     Assessment:   22 year old female status-post open reduction internal fixation right distal radius fracture with > 3 fragments completed on 5/15/2018. Question of possible SL injury at time of initial injury.     Plan:   The patient reports that she is doing well with improved range of motion. We discussed limitations such as no lifting anything heavier than a coffee cup for the next 2 weeks. After two weeks she can discontinue using a brace and lift a maximum of 5 pounds. 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  She is doing very well so I am comfortable deferring this visit till her return in approximately 6 weeks. She will get new therapy exercises to do at home. We will hold of on strengthening until then. She will discontinue the brace entriely in two weeks and I will see her back in 6 weeks.       Scribe Disclosure:   I, Jeromy Forman, am serving as a scribe to document services personally performed by Ezio Yin MD at this visit, based upon the provider's statements to me. All documentation has been reviewed by the aforementioned provider prior to being entered into the official medical record.     Portions of this medical record were completed by a scribe. UPON MY REVIEW AND AUTHENTICATION BY ELECTRONIC SIGNATURE, this confirms (a) I performed the applicable clinical services, and (b) the record is accurate.

## 2018-06-13 NOTE — MR AVS SNAPSHOT
"              After Visit Summary   2018    Karen Aguayo    MRN: [id number]           Patient Information     Date Of Birth          1995        Visit Information        Provider Department      2018 1:30 PM Ezio Yin MD [facility]        Today's Diagnoses     Closed fracture of distal end of right radius with routine healing, unspecified fracture morphology, subsequent encounter    -  1       Follow-ups after your visit        Who to contact     Please call your clinic at [phone] to:    Ask questions about your health    Make or cancel appointments    Discuss your medicines    Learn about your test results    Speak to your doctor            Additional Information About Your Visit        MyChart Information     Instapiot is an electronic gateway that provides easy, online access to your medical records. With HeadCount, you can request a clinic appointment, read your test results, renew a prescription or communicate with your care team.     To sign up for Instapiot visit the website at [email or web address]   You will be asked to enter the access code listed below, as well as some personal information. Please follow the directions to create your username and password.     Your access code is: [email or web address]  Expires: 2018  1:28 PM     Your access code will  in 90 days. If you need help or a new code, please contact your [facility] or call [phone] for assistance.        Care EveryWhere ID     This is your Care EveryWhere ID. This could be used by other organizations to access your [place] medical records  [id number]        Your Vitals Were     Height BMI (Body Mass Index)                1.702 m (5' 7\") 21.46 kg/m2           Blood Pressure from Last 3 Encounters:   05/15/18 105/62   18 106/74    Weight from Last 3 Encounters:   18 62.1 kg (137 lb)   18 62.1 kg (137 lb)   05/15/18 62.4 kg (137 lb 8 oz) "              Today, you had the following     No orders found for display       Primary Care Provider Office Phone # Fax #    Ezio Lazaro Yin [phone]       [address] [facility] [place] [address]        Equal Access to Services     NICKY LANG : Hadii lizzie ku fitzo Soomaali, waaxda luqadaha, qaybta kaalmada adeegyada, tate hayesn tariq ferguson laJackelinbenjamin lee. So [facility] [phone].    ATENCIÓN: Si habla español, tiene a vallejo disposición servicios gratuitos de asistencia lingüística. Llame al [phone].    We comply with applicable federal civil rights laws and [place] laws. We do not discriminate on the basis of race, color, national origin, age, disability, sex, sexual orientation, or gender identity.            Thank you!     Thank you for choosing [facility]  for your care. Our goal is always to provide you with excellent care. Hearing back from our patients is one way we can continue to improve our services. Please take a few minutes to complete the written survey that you may receive in the mail after your visit with us. Thank you!             Your Updated Medication List - Protect others around you: Learn how to safely use, store and throw away your medicines at www.disposemymeds.org.          This list is accurate as of 6/13/18 11:59 PM.  Always use your most recent med list.                   Brand Name Dispense Instructions for use Diagnosis    oxyCODONE-acetaminophen 5-325 MG per tablet    PERCOCET    30 tablet    Take 1-2 tablets by mouth every 4 hours as needed for pain (moderate to severe)    Closed Olmos's fracture of right radius, initial encounter       senna-docusate 8.6-50 MG per tablet    SENOKOT-S;PERICOLACE    30 tablet    Take 1-2 tablets by mouth 2 times daily Take while on oral narcotics to prevent or treat constipation.    Closed Olmos's fracture of right radius, initial encounter

## 2018-08-06 ENCOUNTER — RADIANT APPOINTMENT (OUTPATIENT)
Dept: GENERAL RADIOLOGY | Facility: CLINIC | Age: 23
End: 2018-08-06
Attending: ORTHOPAEDIC SURGERY
Payer: COMMERCIAL

## 2018-08-06 ENCOUNTER — OFFICE VISIT (OUTPATIENT)
Dept: ORTHOPEDICS | Facility: CLINIC | Age: 23
End: 2018-08-06
Payer: COMMERCIAL

## 2018-08-06 VITALS — BODY MASS INDEX: 22.29 KG/M2 | WEIGHT: 142 LBS | HEIGHT: 67 IN

## 2018-08-06 DIAGNOSIS — S52.501D CLOSED FRACTURE OF DISTAL END OF RIGHT RADIUS WITH ROUTINE HEALING: ICD-10-CM

## 2018-08-06 DIAGNOSIS — S52.501D CLOSED FRACTURE OF DISTAL END OF RIGHT RADIUS WITH ROUTINE HEALING, UNSPECIFIED FRACTURE MORPHOLOGY, SUBSEQUENT ENCOUNTER: Primary | ICD-10-CM

## 2018-08-06 DIAGNOSIS — S52.501D CLOSED FRACTURE OF DISTAL END OF RIGHT RADIUS WITH ROUTINE HEALING: Primary | ICD-10-CM

## 2018-08-06 NOTE — NURSING NOTE
"Reason For Visit:   Chief Complaint   Patient presents with     Surgical Followup     distal radius fracture ORIF. DOS: 05/15/18       Primary MD: Ezio Yin  Ref. MD: Established     ?  No     Age: 22 year old     Date of surgery: 5/15/18  Type of surgery: ORIF right distal radius fracture      Ht 1.702 m (5' 7\")  Wt 64.4 kg (142 lb)  BMI 22.24 kg/m2      Pain Assessment  Patient Currently in Pain: Denies               QuickDASH Assessment  QuickDASH Main 8/6/2018   1.Open a tight or new jar. No difficulty   2. Do heavy household chores (e.g., wash walls, floors) Mild difficulty   3. Carry a shopping bag or briefcase. No difficulty   4. Wash your back. No difficulty   5. Use a knife to cut food. No difficulty   6. Recreational activities in which you take some force or impact through your arm, shoulder or hand (e.g., golf, hammering, tennis, etc.). Mild difficulty   7. During the past week, to what extent has your arm, shoulder or hand problem interfered with your normal social activities with family, friends, neighbours or groups? Slightly   8. During the past week, were you limited in your work or other regular daily activities as a result of your arm, shoulder or hand problem? Slightly limited   9. Arm, shoulder or hand pain. Mild   10.Tingling (pins and needles) in your arm,shoulder or hand. None   11. During the past week, how much difficulty have you had sleeping because of the pain in your arm, shoulder or hand? ([place] number) No difficulty   Quickdash Ability Score 11.36          Current Outpatient Prescriptions   Medication Sig Dispense Refill     oxyCODONE-acetaminophen (PERCOCET) 5-325 MG per tablet Take 1-2 tablets by mouth every 4 hours as needed for pain (moderate to severe) (Patient not taking: Reported on 5/23/2018) 30 tablet 0     senna-docusate (SENOKOT-S;PERICOLACE) 8.6-50 MG per tablet Take 1-2 tablets by mouth 2 times daily Take while on oral narcotics to prevent or " treat constipation. (Patient not taking: Reported on 5/23/2018) 30 tablet 0       No Known Allergies    Yevgeniy JARRETT, CMA

## 2018-08-06 NOTE — LETTER
Date:August 7, 2018      Patient was self referred, no letter generated. Do not send.        [facility] Health Information

## 2018-08-06 NOTE — MR AVS SNAPSHOT
"              After Visit Summary   8/6/2018    Karen Aguayo    MRN: [id number]           Patient Information     Date Of Birth          1995        Visit Information        Provider Department      8/6/2018 12:15 PM Ezio Yin MD [facility]        Today's Diagnoses     Closed fracture of distal end of right radius with routine healing, unspecified fracture morphology, subsequent encounter    -  1       Follow-ups after your visit        Your next 10 appointments already scheduled     Nov 05, 2018  9:00 AM CST   (Arrive by 8:45 AM)   RETURN HAND with Ezio Yin MD   [facility] ([facility] [place])    [address]  [address] [facility] [address] [phone]              Who to contact     Please call your clinic at [phone] to:    Ask questions about your health    Make or cancel appointments    Discuss your medicines    Learn about your test results    Speak to your doctor            Additional Information About Your Visit        Care EveryWhere ID     This is your Care EveryWhere ID. This could be used by other organizations to access your [place] medical records  [id number]        Your Vitals Were     Height BMI (Body Mass Index)                1.702 m (5' 7\") 22.24 kg/m2           Blood Pressure from Last 3 Encounters:   05/15/18 105/62   05/04/18 106/74    Weight from Last 3 Encounters:   08/06/18 64.4 kg (142 lb)   06/13/18 62.1 kg (137 lb)   05/23/18 62.1 kg (137 lb)              Today, you had the following     No orders found for display       Primary Care Provider    None Specified       No primary provider on file.        Equal Access to Services     [facility]: Hadii lizzie Cummings, warod garibay, qaybta kaalmada tate torres . [facility] [phone].    ATENCIÓN: Si habla español, tiene a vallejo disposición servicios gratuitos de asistencia lingüística. Llame " al [phone].    We comply with applicable federal civil rights laws and [place] laws. We do not discriminate on the basis of race, color, national origin, age, disability, sex, sexual orientation, or gender identity.            Thank you!     Thank you for choosing [facility]  for your care. Our goal is always to provide you with excellent care. Hearing back from our patients is one way we can continue to improve our services. Please take a few minutes to complete the written survey that you may receive in the mail after your visit with us. Thank you!             Your Updated Medication List - Protect others around you: Learn how to safely use, store and throw away your medicines at www.disposemymeds.org.          This list is accurate as of 8/6/18  5:56 PM.  Always use your most recent med list.                   Brand Name Dispense Instructions for use Diagnosis    oxyCODONE-acetaminophen 5-325 MG per tablet    PERCOCET    30 tablet    Take 1-2 tablets by mouth every 4 hours as needed for pain (moderate to severe)    Closed Olmos's fracture of right radius, initial encounter       senna-docusate 8.6-50 MG per tablet    SENOKOT-S;PERICOLACE    30 tablet    Take 1-2 tablets by mouth 2 times daily Take while on oral narcotics to prevent or treat constipation.    Closed Olmos's fracture of right radius, initial encounter

## 2018-08-06 NOTE — PROGRESS NOTES
"[facility]  Orthopedics  Ezio Yin MD  2018      Name: Karen Aguayo  MRN: [id number]  Age: 22 year old  : 1995  Referring provider: Ezio Yin      Chief Complaint:   Surgical Followup (The patient is following up today after a right ORIF distal radius fracture. DOS: 5/15/18.)      History of Present Illness:   Karen Aguayo is a 22 year old female 3 month status-post open reduction internal fixation right distal radius fracture with > 3 fragments who presents for postoperative evaluation. I last evaluated the patient on 18.   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 She was initially having some soreness with her wrist with use, but after a week or two of regular playing this resolved. At this point she has almost no discomfort and no activity limiation. Her range of motion is still improving but she feels it is near symmetric to the contralateral side. She has no other complaints today. She is satisfied with the results of surgery.. Denies any numbness or tingling.     Physical Examination:  Ht 1.702 m (5' 7\")  Wt 62.1 kg (137 lb)  BMI 21.46 kg/m2  General: Healthy appearing female. Affect appropriate. Normal gait. Alert and oriented to surroundings.   Right Upper Extremity: Radial pulses are palpable and symmetric. Sensation intact to light touch in all digits in median, radial and ulnar nerve distributions. Surgical incision well-healed without evidence of induration, erythema, or drainage. Thumb opposition is intact. AROM wrist: 75 degrees of extension, 70 degrees of flexion. Symmetric pronation and supination to contralateral side.  No pain over SL interval. Fingers are warm and well-perfused.       Radiographs:   Radiographs of the right wrist - 3 views (2018):  No changes in hardware or bony alignment. No DISI deformity. No SL widening.      I have independently reviewed the above imaging studies; the results were discussed with the " patient.    Assessment:   22 year old female status-post open reduction internal fixation right distal radius fracture with > 3 fragments completed on 5/15/2018. Question of possible SL injury at time of initial injury.      Plan:   She is doing well. No restrictions for activity at this point, she can go back to her normal day to day activities as tolerated. I do not believe further hand therapy sessions would be needed at this time. Would like her to follow up in 3 months for a recheck. If she has any problems or qeustions in the interim, she knows to contact me.           Scribe Disclosure:   I, Eber Richmond, am serving as a scribe to document services personally performed by Ezio Yin MD at this visit, based upon the provider's statements to me. All documentation has been reviewed by the aforementioned provider prior to being entered into the official medical record.     Portions of this medical record were completed by a scribe. UPON MY REVIEW AND AUTHENTICATION BY ELECTRONIC SIGNATURE, this confirms (a) I performed the applicable clinical services, and (b) the record is accurate.

## 2018-08-06 NOTE — LETTER
"2018       RE: Karen Aguayo  [address]  [place] [address]     Dear Colleague,    Thank you for referring your patient, Karen Aguayo, to the [facility] at [facility]. Please see a copy of my visit note below.    [facility]  Orthopedics  Ezio Yin MD  2018      Name: Karen Aguayo  MRN: [id number]  Age: 22 year old  : 1995  Referring provider: Ezio Yin      Chief Complaint:   Surgical Followup (The patient is following up today after a right ORIF distal radius fracture. DOS: 5/15/18.)      History of Present Illness:   Karen Aguayo is a 22 year old female 3 month status-post open reduction internal fixation right distal radius fracture with > 3 fragments who presents for postoperative evaluation. I last evaluated the patient on 18.   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 She was initially having some soreness with her wrist with use, but after a week or two of regular playing this resolved. At this point she has almost no discomfort and no activity limiation. Her range of motion is still improving but she feels it is near symmetric to the contralateral side. She has no other complaints today. She is satisfied with the results of surgery.. Denies any numbness or tingling.     Physical Examination:  Ht 1.702 m (5' 7\")  Wt 62.1 kg (137 lb)  BMI 21.46 kg/m2  General: Healthy appearing female. Affect appropriate. Normal gait. Alert and oriented to surroundings.   Right Upper Extremity: Radial pulses are palpable and symmetric. Sensation intact to light touch in all digits in median, radial and ulnar nerve distributions. Surgical incision well-healed without evidence of induration, erythema, or drainage. Thumb opposition is intact. AROM wrist: 75 degrees of extension, 70 degrees of flexion. Symmetric pronation and supination to contralateral side.  No pain over SL interval. Fingers are warm and " well-perfused.       Radiographs:   Radiographs of the right wrist - 3 views (08/6/2018):  No changes in hardware or bony alignment. No DISI deformity. No SL widening.      I have independently reviewed the above imaging studies; the results were discussed with the patient.    Assessment:   22 year old female status-post open reduction internal fixation right distal radius fracture with > 3 fragments completed on 5/15/2018. Question of possible SL injury at time of initial injury.      Plan:   She is doing well. No restrictions for activity at this point, she can go back to her normal day to day activities as tolerated. I do not believe further hand therapy sessions would be needed at this time. Would like her to follow up in 3 months for a recheck. If she has any problems or qeustions in the interim, she knows to contact me.           Scribe Disclosure:   I, Eber Richmond, am serving as a scribe to document services personally performed by Ezio Yin MD at this visit, based upon the provider's statements to me. All documentation has been reviewed by the aforementioned provider prior to being entered into the official medical record.     Portions of this medical record were completed by a scribe. UPON MY REVIEW AND AUTHENTICATION BY ELECTRONIC SIGNATURE, this confirms (a) I performed the applicable clinical services, and (b) the record is accurate.      Again, thank you for allowing me to participate in the care of your patient.      Sincerely,    Ezio Yin MD

## 2018-11-05 ENCOUNTER — RADIANT APPOINTMENT (OUTPATIENT)
Dept: GENERAL RADIOLOGY | Facility: CLINIC | Age: 23
End: 2018-11-05
Attending: ORTHOPAEDIC SURGERY
Payer: COMMERCIAL

## 2018-11-05 ENCOUNTER — OFFICE VISIT (OUTPATIENT)
Dept: ORTHOPEDICS | Facility: CLINIC | Age: 23
End: 2018-11-05
Payer: COMMERCIAL

## 2018-11-05 DIAGNOSIS — S52.501D CLOSED FRACTURE OF DISTAL END OF RIGHT RADIUS WITH ROUTINE HEALING, UNSPECIFIED FRACTURE MORPHOLOGY, SUBSEQUENT ENCOUNTER: Primary | ICD-10-CM

## 2018-11-05 DIAGNOSIS — S52.501D CLOSED FRACTURE OF DISTAL END OF RIGHT RADIUS WITH ROUTINE HEALING: ICD-10-CM

## 2018-11-05 DIAGNOSIS — S52.501D CLOSED FRACTURE OF DISTAL END OF RIGHT RADIUS WITH ROUTINE HEALING: Primary | ICD-10-CM

## 2018-11-05 NOTE — NURSING NOTE
Reason For Visit:   Chief Complaint   Patient presents with     Surgical Followup     6 month pop ORIF right distal radius fracture.  DPS 5/15/2018       Primary MD: Ezio Yin  Ref. MD: Established      ?  No      Age: 22 year old      Date of surgery: 5/15/18  Type of surgery: ORIF right distal radius fracture      There were no vitals taken for this visit.      Pain Assessment  Patient Currently in Pain: Denies               QuickDASH Assessment  QuickDASH Main 11/5/2018   1.Open a tight or new jar. No difficulty   2. Do heavy household chores (e.g., wash walls, floors) Mild difficulty   3. Carry a shopping bag or briefcase. No difficulty   4. Wash your back. No difficulty   5. Use a knife to cut food. No difficulty   6. Recreational activities in which you take some force or impact through your arm, shoulder or hand (e.g., golf, hammering, tennis, etc.). Mild difficulty   7. During the past week, to what extent has your arm, shoulder or hand problem interfered with your normal social activities with family, friends, neighbours or groups? Not at all   8. During the past week, were you limited in your work or other regular daily activities as a result of your arm, shoulder or hand problem? Not limited at all   9. Arm, shoulder or hand pain. Mild   10.Tingling (pins and needles) in your arm,shoulder or hand. None   11. During the past week, how much difficulty have you had sleeping because of the pain in your arm, shoulder or hand? ([place] number) No difficulty   Quickdash Ability Score 6.81          No Known Allergies    Jeanine Hi, ATC

## 2018-11-05 NOTE — PROGRESS NOTES
"[facility]  Orthopedics  Ezio Yin MD  2018     Name: Karen Aguayo  MRN: [id number]  Age: 23 year old  : 1995  Referring provider: Jose G Cannon     Chief Complaint: Postoperative follow-up    Date of Surgery: 5/15/18    Procedure: open reduction internal fixation right distal radius fracture with > 3 fragments     History of Present Illness:   Karen Aguayo is a 23 year old, female who presents today for postoperative follow-up regarding the above surgery. I last evaluated he patient on 18, at which time she was progressing well. Today, she reports that she has been doing well. She experiences soreness very rarely in the wrist after using it for a while, such as when scrubbing something hard or practicing piano for a long time . She denies any numbness or tingling.  she has been able to return fully to all of her activities without restriction. She has no concerns at this time.     Review of Systems:   A 10-point review of systems was obtained and is negative except for as noted in the HPI.     Physical Examination:  Ht 1.702 m (5' 7\")  Wt 62.1 kg (137 lb)  BMI 21.46 kg/m2  General: Healthy appearing female. Affect appropriate. Normal gait. Alert and oriented to surroundings.     On examination of Right Upper Extremity: Radial pulse  Palpable. Sensation intact to light touch in all digits in median, radial and ulnar nerve distributions. Surgical incision well-healed without evidence of induration, erythema, or drainage. Thumb opposition is intact. AROM wrist: 70 degrees of extension, 70 degrees of flexion. Symmetric pronation and supination to contralateral side.  No pain over SL interval. Fingers are warm and well-perfused.      Imaging:  XR right wrist 3 views (18)  Healing over right distal radial fracture with no evidence of scapho-lunate diastasis    I have independently reviewed the above imaging studies; the results were discussed with the patient.     Assessment:   23 " year old, female with status-post open reduction internal fixation right distal radius fracture with > 3 fragments completed on 5/15/2018, progressing appropriately     Plan:   The patient is doing well.  she can continue to do all her activities as tolerated. No restrictions at this time. She can follow-up with me on an as-needed basis. However, she knows to contact me should she have any further questions or concerns.     All the patient's questions were answered to the best of my ability.    Ezio Yin MD      Scribe Disclosure:   I, Matt Low, am serving as a scribe to document services personally performed by Ezio Yin MD at this visit, based upon the provider's statements to me. All documentation has been reviewed by the aforementioned provider prior to being entered into the official medical record.

## 2018-11-05 NOTE — LETTER
"2018     RE: Karen Aguayo  [address]  [place] [address]     Dear Colleague,    Thank you for referring your patient, Karen Aguayo, to the [facility] at [facility]. Please see a copy of my visit note below.    [facility]  Orthopedics  Ezio Yin MD  2018     Name: Karen Aguayo  MRN: [id number]  Age: 23 year old  : 1995  Referring provider: Jose G Cannon     Chief Complaint: Postoperative follow-up    Date of Surgery: 5/15/18    Procedure: open reduction internal fixation right distal radius fracture with > 3 fragments     History of Present Illness:   Karen Aguayo is a 23 year old, female who presents today for postoperative follow-up regarding the above surgery. I last evaluated he patient on 18, at which time she was progressing well. Today, she reports that she has been doing well. She experiences soreness very rarely in the wrist after using it for a while, such as when scrubbing something hard or practicing piano for a long time . She denies any numbness or tingling.  she has been able to return fully to all of her activities without restriction. She has no concerns at this time.     Review of Systems:   A 10-point review of systems was obtained and is negative except for as noted in the HPI.     Physical Examination:  Ht 1.702 m (5' 7\")  Wt 62.1 kg (137 lb)  BMI 21.46 kg/m2  General: Healthy appearing female. Affect appropriate. Normal gait. Alert and oriented to surroundings.     On examination of Right Upper Extremity: Radial pulse  Palpable. Sensation intact to light touch in all digits in median, radial and ulnar nerve distributions. Surgical incision well-healed without evidence of induration, erythema, or drainage. Thumb opposition is intact. AROM wrist: 70 degrees of extension, 70 degrees of flexion. Symmetric pronation and supination to contralateral side.  No pain over SL interval. Fingers are warm and " well-perfused.      Imaging:  XR right wrist 3 views (11/5/18)  Healing over right distal radial fracture with no evidence of scapho-lunate diastasis    I have independently reviewed the above imaging studies; the results were discussed with the patient.     Assessment:   23 year old, female with status-post open reduction internal fixation right distal radius fracture with > 3 fragments completed on 5/15/2018, progressing appropriately     Plan:   The patient is doing well.  she can continue to do all her activities as tolerated. No restrictions at this time. She can follow-up with me on an as-needed basis. However, she knows to contact me should she have any further questions or concerns.     All the patient's questions were answered to the best of my ability.    Ezio Yin MD    Scribe Disclosure:   I, Matt Low, am serving as a scribe to document services personally performed by Ezio Yin MD at this visit, based upon the provider's statements to me. All documentation has been reviewed by the aforementioned provider prior to being entered into the official medical record.

## 2018-11-05 NOTE — MR AVS SNAPSHOT
After Visit Summary   2018    Karen Aguayo    MRN: [id number]           Patient Information     Date Of Birth          1995        Visit Information        Provider Department      2018 9:00 AM [facility]in [facility]        Today's Diagnoses     Closed fracture of distal end of right radius with routine healing, unspecified fracture morphology, subsequent encounter    -  1       Follow-ups after your visit        Who to contact     Please call your clinic at [phone] to:    Ask questions about your health    Make or cancel appointments    Discuss your medicines    Learn about your test results    Speak to your doctor            Additional Information About Your Visit        MyChart Information     [employer] is an electronic gateway that provides easy, online access to your medical records. With [employer], you can request a clinic appointment, read your test results, renew a prescription or communicate with your care team.     To sign up for [employer] visit the website at [email or web address]   You will be asked to enter the access code listed below, as well as some personal information. Please follow the directions to create your username and password.     Your access code is: [email or web address]  Expires: 2/3/2019  6:30 AM     Your access code will  in 90 days. If you need help or a new code, please contact your [facility] or call [phone] for assistance.        Care EveryWhere ID     This is your Care EveryWhere ID. This could be used by other organizations to access your [place] medical records  [id number]         Blood Pressure from Last 3 Encounters:   05/15/18 105/62   18 106/74    Weight from Last 3 Encounters:   18 64.4 kg (142 lb)   18 62.1 kg (137 lb)   18 62.1 kg (137 lb)              Today, you had the following     No orders found for display       Primary Care Provider    None  Specified       No primary provider on file.        Equal Access to Services     NICKY LANG : Hadii aad ku hadzeniamaxime Cummings, wapierreda chinedudameonha, qajenniferta sarahmeritate garcia. So [facility] [phone].    ATENCIÓN: Si habla español, tiene a vallejo disposición servicios gratuitos de asistencia lingüística. Llame al [phone].    We comply with applicable federal civil rights laws and [place] laws. We do not discriminate on the basis of race, color, national origin, age, disability, sex, sexual orientation, or gender identity.            Thank you!     Thank you for choosing [facility]  for your care. Our goal is always to provide you with excellent care. Hearing back from our patients is one way we can continue to improve our services. Please take a few minutes to complete the written survey that you may receive in the mail after your visit with us. Thank you!             Your Updated Medication List - Protect others around you: Learn how to safely use, store and throw away your medicines at www.disposemymeds.org.          This list is accurate as of 11/5/18 11:24 AM.  Always use your most recent med list.                   Brand Name Dispense Instructions for use Diagnosis    oxyCODONE-acetaminophen 5-325 MG per tablet    PERCOCET    30 tablet    Take 1-2 tablets by mouth every 4 hours as needed for pain (moderate to severe)    Closed Olmos's fracture of right radius, initial encounter       senna-docusate 8.6-50 MG per tablet    SENOKOT-S;PERICOLACE    30 tablet    Take 1-2 tablets by mouth 2 times daily Take while on oral narcotics to prevent or treat constipation.    Closed Olmos's fracture of right radius, initial encounter

## 2019-01-08 PROBLEM — M25.631 WRIST STIFFNESS, RIGHT: Status: RESOLVED | Noted: 2018-05-23 | Resolved: 2019-01-08

## 2019-01-08 PROBLEM — Z47.89 AFTERCARE FOLLOWING SURGERY OF THE MUSCULOSKELETAL SYSTEM: Status: RESOLVED | Noted: 2018-05-23 | Resolved: 2019-01-08

## 2019-01-08 PROBLEM — M79.641 PAIN OF RIGHT HAND: Status: RESOLVED | Noted: 2018-05-23 | Resolved: 2019-01-08

## 2019-01-09 NOTE — PROGRESS NOTES
Discharge Summary - Hand Therapy    Patient did not return to therapy.  We will assume that patient's goals were met.    D/C from hand therapy.

## 2020-01-26 ENCOUNTER — APPOINTMENT (OUTPATIENT)
Dept: ULTRASOUND IMAGING | Facility: CLINIC | Age: 25
End: 2020-01-26
Attending: FAMILY MEDICINE
Payer: COMMERCIAL

## 2020-01-26 ENCOUNTER — HOSPITAL ENCOUNTER (INPATIENT)
Facility: CLINIC | Age: 25
LOS: 2 days | Discharge: HOME | End: 2020-01-29
Attending: FAMILY MEDICINE | Admitting: EMERGENCY MEDICINE
Payer: COMMERCIAL

## 2020-01-26 DIAGNOSIS — N83.8 LEFT OVARIAN ENLARGEMENT: ICD-10-CM

## 2020-01-26 DIAGNOSIS — N70.93 TOA (TUBO-OVARIAN ABSCESS): Primary | ICD-10-CM

## 2020-01-26 DIAGNOSIS — R50.9 FEVER AND CHILLS: ICD-10-CM

## 2020-01-26 DIAGNOSIS — N10 PYELONEPHRITIS, ACUTE: ICD-10-CM

## 2020-01-26 DIAGNOSIS — R10.2 SUPRAPUBIC PAIN: ICD-10-CM

## 2020-01-26 PROBLEM — N12 PYELONEPHRITIS: Status: ACTIVE | Noted: 2020-01-26

## 2020-01-26 LAB
ALBUMIN SERPL-MCNC: 3.6 G/DL (ref 3.4–5)
ALBUMIN UR-MCNC: 30 MG/DL
ALP SERPL-CCNC: 107 U/L (ref 40–150)
ALT SERPL W P-5'-P-CCNC: 12 U/L (ref 0–50)
ANION GAP SERPL CALCULATED.3IONS-SCNC: 6 MMOL/L (ref 3–14)
APPEARANCE UR: ABNORMAL
AST SERPL W P-5'-P-CCNC: 6 U/L (ref 0–45)
BASOPHILS # BLD AUTO: 0.1 10E9/L (ref 0–0.2)
BASOPHILS # BLD AUTO: 0.2 10E9/L (ref 0–0.2)
BASOPHILS NFR BLD AUTO: 0.8 %
BASOPHILS NFR BLD AUTO: 0.9 %
BILIRUB SERPL-MCNC: 1.6 MG/DL (ref 0.2–1.3)
BILIRUB UR QL STRIP: ABNORMAL
BUN SERPL-MCNC: 13 MG/DL (ref 7–30)
C DIFF TOX B STL QL: NEGATIVE
CALCIUM SERPL-MCNC: 9.5 MG/DL (ref 8.5–10.1)
CHLORIDE SERPL-SCNC: 100 MMOL/L (ref 94–109)
CO2 SERPL-SCNC: 27 MMOL/L (ref 20–32)
COLOR UR AUTO: YELLOW
CREAT SERPL-MCNC: 1.11 MG/DL (ref 0.52–1.04)
DIFFERENTIAL METHOD BLD: ABNORMAL
DIFFERENTIAL METHOD BLD: ABNORMAL
EOSINOPHIL # BLD AUTO: 0 10E9/L (ref 0–0.7)
EOSINOPHIL # BLD AUTO: 7.4 10E9/L (ref 0–0.7)
EOSINOPHIL NFR BLD AUTO: 0 %
EOSINOPHIL NFR BLD AUTO: 37.4 %
ERYTHROCYTE [DISTWIDTH] IN BLOOD BY AUTOMATED COUNT: 12.3 % (ref 10–15)
ERYTHROCYTE [DISTWIDTH] IN BLOOD BY AUTOMATED COUNT: 12.3 % (ref 10–15)
GFR SERPL CREATININE-BSD FRML MDRD: 70 ML/MIN/{1.73_M2}
GLUCOSE SERPL-MCNC: 129 MG/DL (ref 70–99)
GLUCOSE UR STRIP-MCNC: NEGATIVE MG/DL
GRAN CASTS #/AREA URNS LPF: 14 /LPF
HCG UR QL: NEGATIVE
HCT VFR BLD AUTO: 41 % (ref 35–47)
HCT VFR BLD AUTO: 45.3 % (ref 35–47)
HGB BLD-MCNC: 13.3 G/DL (ref 11.7–15.7)
HGB BLD-MCNC: 15 G/DL (ref 11.7–15.7)
HGB UR QL STRIP: ABNORMAL
IMM GRANULOCYTES # BLD: 0.5 10E9/L (ref 0–0.4)
IMM GRANULOCYTES NFR BLD: 2.6 %
KETONES UR STRIP-MCNC: NEGATIVE MG/DL
LACTATE BLD-SCNC: 1.5 MMOL/L (ref 0.7–2)
LEUKOCYTE ESTERASE UR QL STRIP: ABNORMAL
LYMPHOCYTES # BLD AUTO: 0.8 10E9/L (ref 0.8–5.3)
LYMPHOCYTES # BLD AUTO: 0.8 10E9/L (ref 0.8–5.3)
LYMPHOCYTES NFR BLD AUTO: 4.2 %
LYMPHOCYTES NFR BLD AUTO: 5.2 %
MCH RBC QN AUTO: 30.3 PG (ref 26.5–33)
MCH RBC QN AUTO: 30.4 PG (ref 26.5–33)
MCHC RBC AUTO-ENTMCNC: 32.4 G/DL (ref 31.5–36.5)
MCHC RBC AUTO-ENTMCNC: 33.1 G/DL (ref 31.5–36.5)
MCV RBC AUTO: 92 FL (ref 78–100)
MCV RBC AUTO: 94 FL (ref 78–100)
MONOCYTES # BLD AUTO: 0.7 10E9/L (ref 0–1.3)
MONOCYTES # BLD AUTO: 1.1 10E9/L (ref 0–1.3)
MONOCYTES NFR BLD AUTO: 4.3 %
MONOCYTES NFR BLD AUTO: 5.3 %
MUCOUS THREADS #/AREA URNS LPF: PRESENT /LPF
NEUTROPHILS # BLD AUTO: 14.2 10E9/L (ref 1.6–8.3)
NEUTROPHILS # BLD AUTO: 9.9 10E9/L (ref 1.6–8.3)
NEUTROPHILS NFR BLD AUTO: 49.7 %
NEUTROPHILS NFR BLD AUTO: 89.6 %
NITRATE UR QL: NEGATIVE
NRBC # BLD AUTO: 0 10*3/UL
NRBC BLD AUTO-RTO: 0 /100
PH UR STRIP: 5.5 PH (ref 5–7)
PLATELET # BLD AUTO: 282 10E9/L (ref 150–450)
PLATELET # BLD AUTO: 285 10E9/L (ref 150–450)
PLATELET # BLD EST: ABNORMAL 10*3/UL
POTASSIUM SERPL-SCNC: 3.6 MMOL/L (ref 3.4–5.3)
PROT SERPL-MCNC: 8.2 G/DL (ref 6.8–8.8)
RADIOLOGIST FLAGS: ABNORMAL
RBC # BLD AUTO: 4.38 10E12/L (ref 3.8–5.2)
RBC # BLD AUTO: 4.95 10E12/L (ref 3.8–5.2)
RBC #/AREA URNS AUTO: 56 /HPF (ref 0–2)
RBC MORPH BLD: NORMAL
SODIUM SERPL-SCNC: 133 MMOL/L (ref 133–144)
SOURCE: ABNORMAL
SP GR UR STRIP: 1.02 (ref 1–1.03)
SPECIMEN SOURCE: NORMAL
SPECIMEN SOURCE: NORMAL
SQUAMOUS #/AREA URNS AUTO: 6 /HPF (ref 0–1)
UROBILINOGEN UR STRIP-MCNC: 2 MG/DL (ref 0–2)
WBC # BLD AUTO: 15.8 10E9/L (ref 4–11)
WBC # BLD AUTO: 19.8 10E9/L (ref 4–11)
WBC #/AREA URNS AUTO: 14 /HPF (ref 0–5)
WET PREP SPEC: NORMAL

## 2020-01-26 PROCEDURE — 83605 ASSAY OF LACTIC ACID: CPT | Performed by: EMERGENCY MEDICINE

## 2020-01-26 PROCEDURE — 85025 COMPLETE CBC W/AUTO DIFF WBC: CPT | Performed by: NURSE PRACTITIONER

## 2020-01-26 PROCEDURE — 25000132 ZZH RX MED GY IP 250 OP 250 PS 637: Performed by: NURSE PRACTITIONER

## 2020-01-26 PROCEDURE — 99220 ZZC INITIAL OBSERVATION CARE,LEVL III: CPT | Mod: Z6 | Performed by: NURSE PRACTITIONER

## 2020-01-26 PROCEDURE — 36415 COLL VENOUS BLD VENIPUNCTURE: CPT | Performed by: NURSE PRACTITIONER

## 2020-01-26 PROCEDURE — 87491 CHLMYD TRACH DNA AMP PROBE: CPT | Performed by: FAMILY MEDICINE

## 2020-01-26 PROCEDURE — 96361 HYDRATE IV INFUSION ADD-ON: CPT | Performed by: FAMILY MEDICINE

## 2020-01-26 PROCEDURE — 25000128 H RX IP 250 OP 636: Performed by: FAMILY MEDICINE

## 2020-01-26 PROCEDURE — 96361 HYDRATE IV INFUSION ADD-ON: CPT

## 2020-01-26 PROCEDURE — 87506 IADNA-DNA/RNA PROBE TQ 6-11: CPT | Performed by: NURSE PRACTITIONER

## 2020-01-26 PROCEDURE — 93976 VASCULAR STUDY: CPT

## 2020-01-26 PROCEDURE — 99285 EMERGENCY DEPT VISIT HI MDM: CPT | Mod: 25 | Performed by: FAMILY MEDICINE

## 2020-01-26 PROCEDURE — G0378 HOSPITAL OBSERVATION PER HR: HCPCS

## 2020-01-26 PROCEDURE — 93010 ELECTROCARDIOGRAM REPORT: CPT | Performed by: INTERNAL MEDICINE

## 2020-01-26 PROCEDURE — 25800030 ZZH RX IP 258 OP 636: Performed by: PHYSICIAN ASSISTANT

## 2020-01-26 PROCEDURE — 87493 C DIFF AMPLIFIED PROBE: CPT | Performed by: NURSE PRACTITIONER

## 2020-01-26 PROCEDURE — 96365 THER/PROPH/DIAG IV INF INIT: CPT | Performed by: FAMILY MEDICINE

## 2020-01-26 PROCEDURE — 87086 URINE CULTURE/COLONY COUNT: CPT | Performed by: NURSE PRACTITIONER

## 2020-01-26 PROCEDURE — 76770 US EXAM ABDO BACK WALL COMP: CPT

## 2020-01-26 PROCEDURE — 87591 N.GONORRHOEAE DNA AMP PROB: CPT | Performed by: FAMILY MEDICINE

## 2020-01-26 PROCEDURE — 25800030 ZZH RX IP 258 OP 636: Performed by: FAMILY MEDICINE

## 2020-01-26 PROCEDURE — 25800030 ZZH RX IP 258 OP 636: Performed by: STUDENT IN AN ORGANIZED HEALTH CARE EDUCATION/TRAINING PROGRAM

## 2020-01-26 PROCEDURE — 93005 ELECTROCARDIOGRAM TRACING: CPT

## 2020-01-26 PROCEDURE — 87210 SMEAR WET MOUNT SALINE/INK: CPT | Performed by: FAMILY MEDICINE

## 2020-01-26 PROCEDURE — 85025 COMPLETE CBC W/AUTO DIFF WBC: CPT | Performed by: FAMILY MEDICINE

## 2020-01-26 PROCEDURE — 81025 URINE PREGNANCY TEST: CPT | Performed by: FAMILY MEDICINE

## 2020-01-26 PROCEDURE — 87040 BLOOD CULTURE FOR BACTERIA: CPT | Performed by: FAMILY MEDICINE

## 2020-01-26 PROCEDURE — 80053 COMPREHEN METABOLIC PANEL: CPT | Performed by: FAMILY MEDICINE

## 2020-01-26 PROCEDURE — 96375 TX/PRO/DX INJ NEW DRUG ADDON: CPT

## 2020-01-26 PROCEDURE — 25000132 ZZH RX MED GY IP 250 OP 250 PS 637: Performed by: FAMILY MEDICINE

## 2020-01-26 PROCEDURE — 81001 URINALYSIS AUTO W/SCOPE: CPT | Performed by: FAMILY MEDICINE

## 2020-01-26 PROCEDURE — 25000128 H RX IP 250 OP 636: Performed by: NURSE PRACTITIONER

## 2020-01-26 PROCEDURE — C9113 INJ PANTOPRAZOLE SODIUM, VIA: HCPCS | Performed by: NURSE PRACTITIONER

## 2020-01-26 RX ORDER — ACETAMINOPHEN 500 MG
1000 TABLET ORAL ONCE
Status: COMPLETED | OUTPATIENT
Start: 2020-01-26 | End: 2020-01-26

## 2020-01-26 RX ORDER — CIPROFLOXACIN 500 MG/1
500 TABLET, FILM COATED ORAL 2 TIMES DAILY
Status: ON HOLD | COMMUNITY
End: 2020-01-29

## 2020-01-26 RX ORDER — OXYCODONE AND ACETAMINOPHEN 5; 325 MG/1; MG/1
1-2 TABLET ORAL EVERY 4 HOURS PRN
Status: DISCONTINUED | OUTPATIENT
Start: 2020-01-26 | End: 2020-01-27

## 2020-01-26 RX ORDER — AMOXICILLIN 250 MG
1-2 CAPSULE ORAL 2 TIMES DAILY PRN
Status: DISCONTINUED | OUTPATIENT
Start: 2020-01-26 | End: 2020-01-29 | Stop reason: HOSPADM

## 2020-01-26 RX ORDER — NALOXONE HYDROCHLORIDE 0.4 MG/ML
.1-.4 INJECTION, SOLUTION INTRAMUSCULAR; INTRAVENOUS; SUBCUTANEOUS
Status: DISCONTINUED | OUTPATIENT
Start: 2020-01-26 | End: 2020-01-29 | Stop reason: HOSPADM

## 2020-01-26 RX ORDER — ACETAMINOPHEN 325 MG/1
650 TABLET ORAL EVERY 4 HOURS PRN
Status: DISCONTINUED | OUTPATIENT
Start: 2020-01-26 | End: 2020-01-29 | Stop reason: HOSPADM

## 2020-01-26 RX ORDER — IBUPROFEN 200 MG
200 TABLET ORAL EVERY 6 HOURS PRN
Status: DISCONTINUED | OUTPATIENT
Start: 2020-01-26 | End: 2020-01-26

## 2020-01-26 RX ORDER — CEFTRIAXONE 2 G/1
2 INJECTION, POWDER, FOR SOLUTION INTRAMUSCULAR; INTRAVENOUS DAILY
Status: DISCONTINUED | OUTPATIENT
Start: 2020-01-27 | End: 2020-01-27

## 2020-01-26 RX ORDER — CEFTRIAXONE 1 G/1
1 INJECTION, POWDER, FOR SOLUTION INTRAMUSCULAR; INTRAVENOUS ONCE
Status: COMPLETED | OUTPATIENT
Start: 2020-01-26 | End: 2020-01-26

## 2020-01-26 RX ORDER — CIPROFLOXACIN 2 MG/ML
400 INJECTION, SOLUTION INTRAVENOUS EVERY 12 HOURS
Status: DISCONTINUED | OUTPATIENT
Start: 2020-01-26 | End: 2020-01-26

## 2020-01-26 RX ORDER — LIDOCAINE 40 MG/G
CREAM TOPICAL
Status: DISCONTINUED | OUTPATIENT
Start: 2020-01-26 | End: 2020-01-28

## 2020-01-26 RX ORDER — ONDANSETRON 2 MG/ML
4 INJECTION INTRAMUSCULAR; INTRAVENOUS EVERY 6 HOURS PRN
Status: DISCONTINUED | OUTPATIENT
Start: 2020-01-26 | End: 2020-01-29 | Stop reason: HOSPADM

## 2020-01-26 RX ORDER — SODIUM CHLORIDE 9 MG/ML
1000 INJECTION, SOLUTION INTRAVENOUS CONTINUOUS
Status: DISCONTINUED | OUTPATIENT
Start: 2020-01-26 | End: 2020-01-29

## 2020-01-26 RX ORDER — LIDOCAINE 40 MG/G
CREAM TOPICAL
Status: DISCONTINUED | OUTPATIENT
Start: 2020-01-26 | End: 2020-01-29 | Stop reason: HOSPADM

## 2020-01-26 RX ORDER — IBUPROFEN 200 MG
200 TABLET ORAL EVERY 6 HOURS PRN
COMMUNITY

## 2020-01-26 RX ORDER — ACETAMINOPHEN 325 MG/1
325-650 TABLET ORAL EVERY 6 HOURS PRN
COMMUNITY

## 2020-01-26 RX ORDER — ONDANSETRON 4 MG/1
4 TABLET, ORALLY DISINTEGRATING ORAL EVERY 6 HOURS PRN
Status: DISCONTINUED | OUTPATIENT
Start: 2020-01-26 | End: 2020-01-29 | Stop reason: HOSPADM

## 2020-01-26 RX ADMIN — SODIUM CHLORIDE 1000 ML: 9 INJECTION, SOLUTION INTRAVENOUS at 23:49

## 2020-01-26 RX ADMIN — SODIUM CHLORIDE 1000 ML: 900 INJECTION INTRAVENOUS at 18:33

## 2020-01-26 RX ADMIN — ACETAMINOPHEN 1000 MG: 500 TABLET, FILM COATED ORAL at 17:16

## 2020-01-26 RX ADMIN — SODIUM CHLORIDE 1000 ML: 900 INJECTION INTRAVENOUS at 13:30

## 2020-01-26 RX ADMIN — ACETAMINOPHEN 650 MG: 325 TABLET, FILM COATED ORAL at 21:05

## 2020-01-26 RX ADMIN — PANTOPRAZOLE SODIUM 40 MG: 40 INJECTION, POWDER, FOR SOLUTION INTRAVENOUS at 21:05

## 2020-01-26 RX ADMIN — CEFTRIAXONE 1 G: 1 INJECTION, POWDER, FOR SOLUTION INTRAMUSCULAR; INTRAVENOUS at 16:16

## 2020-01-26 RX ADMIN — SODIUM CHLORIDE 1000 ML: 9 INJECTION, SOLUTION INTRAVENOUS at 12:28

## 2020-01-26 RX ADMIN — SODIUM CHLORIDE 1000 ML: 9 INJECTION, SOLUTION INTRAVENOUS at 21:47

## 2020-01-26 ASSESSMENT — ENCOUNTER SYMPTOMS
VOMITING: 1
SHORTNESS OF BREATH: 0
SINUS PRESSURE: 0
FEVER: 1
DIZZINESS: 1
BRUISES/BLEEDS EASILY: 0
FATIGUE: 1
WEAKNESS: 1
NECK PAIN: 0
DYSPHORIC MOOD: 1
LIGHT-HEADEDNESS: 1
FLANK PAIN: 0
BACK PAIN: 0
NAUSEA: 1
DECREASED CONCENTRATION: 1
CONFUSION: 0
FREQUENCY: 1
ABDOMINAL PAIN: 1
DYSURIA: 1
SINUS PAIN: 0

## 2020-01-26 ASSESSMENT — MIFFLIN-ST. JEOR
SCORE: 1425.85
SCORE: 1427.67

## 2020-01-26 NOTE — PHARMACY-ADMISSION MEDICATION HISTORY
Admission medication history interview status for the 1/26/2020 admission is complete. See Epic admission navigator for allergy information, pharmacy, prior to admission medications and immunization status.     Medication history interview sources:  the patient and Care Everywhere.     Changes made to PTA medication list (reason)  Added:     Tylenol as needed    Ibuprofen as needed    Deleted:     Percocet 5-325mg tablet- Patient not taking, Rx from May 2015.     Senokot-S - Patient not taking, Rx from May 2015  Changed: None.     Medications requiring more detailed information  Recent (3 mo) Antibiotics:   01/25/2020 Ciprofloxacin 500mg twice daily: Patient states she has only taken one dose (yesterday) as she has been sick to her stomach.     Additional medication history information (including reliability of information, actions taken by pharmacist): The patient was a reliable historian who states she is not taking any prescription medication, over the counter medications, vitamins, or herbal supplements other than the three medications listed below.     Prior to Admission medications    Medication Sig Last Dose Taking? Auth Provider   acetaminophen (TYLENOL) 325 MG tablet Take 325-650 mg by mouth every 6 hours as needed for mild pain 1/26/2020 at AM Yes Unknown, Entered By History   ciprofloxacin (CIPRO) 500 MG tablet Take 500 mg by mouth 2 times daily 1/25/2020 at PM Yes Reported, Patient   ibuprofen (ADVIL/MOTRIN) 200 MG tablet Take 200 mg by mouth every 6 hours as needed for mild pain 1/25/2020 at PM  Yes Unknown, Entered By History     Medication history completed by:   Morenita Cornejo   Student Pharmacist  [facility]

## 2020-01-26 NOTE — LETTER
[id number] [place] [address]    20    Karen LONG Dede  [address] [facility] [address]    :  1995    TO WHOM IT MAY CONCERN:    Karen was hospitalized from 2020 through 2020 for medical illness.     Please excuse her from school through the end of the week        Sincerely,      Jessi Harrington MD  Obstetrics and Gynecology  [facility]

## 2020-01-26 NOTE — H&P
"ED OBSERVATION HISTORY & PHYSICAL    Admission Date: 01/26/2019    REASON FOR ADMISSION:   Chief Complaint   Patient presents with     UTI       HPI:    Karen Aguayo is a 24 year old otherwise healthy female who presented to the ED for suprapubic abdominal pain, nausea, vomiting, diarrhea, and syncopal episode. Per care everywhere, she was seen at [facility] on 1/25 and was diagnosed with UTI/Pyelpnephritis.     In Urgent care, WBC was 24.0 UA showed 21-59 WBC's, moderate bacteria, + nitrites, large blood. She was given IM Rocephin and Cipro x 5 days at urgent care. She took one dose of Cipro yesterday. This am she started vomiting. Also noted diarrhea. She vomited this am and was then sitting on the toilet. She lost consciousness. No injuries. Denies preceding palpitations, SOB, chest pain/pressure prior to syncope.She got up and was walking to the bedroom when she felt dizzy again. Did not lose consciousness at this time. Patient reports a fever of 102.5 two nights ago, and states she has been taking ibuprofen and Tylenol. She notes suprapubic abdominal pain, urinary frequency. Patient denies flank pain, back pain, or vaginal discharge. Patient reports she is sexually active. Had an IUD placed around 1 month ago. Patient reports she is currently having her menstrual cycle. Patient denies surgical history or history of kidney stones.     In the ED, WBC was 15.8, creatinine was 1.11, lactic acid was 1.5. UA showed 14 WBC's, small LE, negative nitrites. Renal US showed, \" 1. No hydronephrosis. The renal parenchyma is mildly heterogeneous bilaterally, which can be seen in pyelonephritis. 2. Small volume of pelvic free fluid.\" She was tachy to 130 in the ED, febrile to 101.7. Blood pressure is stable. She is admitted to observation for treatment of Pyelonephritis. Pelvic exam complete in the ED. GC and Chlamydia cultures were sent. Will also do Pelvic US prior to admission.     On admission to the " observation unit the patient was stable. Notes ongoing watery diarrhea. Since arrival to the ED, she has noted diarrhea every 1 hour. Notes suprapubic abdominal pain. Denies abdominal pain to other site. Notes poor appetite and PO intake for several days. Denies dysphagia, heartburn. Generally feeling fatigued. Denies respiratory complaints. Denies sinus pressure/drainage, PND, nasal congestion, runny nose,  sore throat/mouth pain. Denies cough, SOB.  Denies headaches  numbness or tingling of hands and feet, confusion, Notes lightheadedness/dizziness when she goes from sit to stand. Denies difficulties with balance    ROS:    CONSTITUTIONAL: +  Denies fever, chills, appetite changes.  MUSCULOSKELTAL: Generalized weakness. Denies muscle/joint pain and weakness    ROS negative other than the symptoms noted above.    History:    History reviewed. No pertinent past medical history.    Past Surgical History:   Procedure Laterality Date     OPEN REDUCTION INTERNAL FIXATION WRIST Right 5/15/2018    Procedure: OPEN REDUCTION INTERNAL FIXATION WRIST;  Open Reduction Internal Fixation Right Distal Radius Fracture ;  Surgeon: Ezio Yin MD;  Location:  OR       [facility]    Social History     Socioeconomic History     Marital status: Single     Spouse name: Not on file     Number of children: Not on file     Years of education: Not on file     Highest education level: Not on file   Occupational History     Not on file   Social Needs     Financial resource strain: Not on file     Food insecurity:     Worry: Not on file     Inability: Not on file     Transportation needs:     Medical: Not on file     Non-medical: Not on file   Tobacco Use     Smoking status: Never Smoker     Smokeless tobacco: Never Used   Substance and Sexual Activity     Alcohol use: Not on file     Drug use: Not on file     Sexual activity: Not on file   Lifestyle     Physical activity:     Days per week: Not on file     Minutes per session: Not  "on file     Stress: Not on file   Relationships     Social connections:     Talks on phone: Not on file     Gets together: Not on file     Attends [employer]ism service: Not on file     Active member of club or organization: Not on file     Attends meetings of clubs or organizations: Not on file     Relationship status: Not on file     Intimate partner violence:     Fear of current or ex partner: Not on file     Emotionally abused: Not on file     Physically abused: Not on file     Forced sexual activity: Not on file   Other Topics Concern     Not on file   Social History Narrative     Not on file   She is a college student. Denies illicit drug use. Occasional alcohol use.     No current facility-administered medications on file prior to encounter.   acetaminophen (TYLENOL) 325 MG tablet, Take 325-650 mg by mouth every 6 hours as needed for mild pain  ciprofloxacin (CIPRO) 500 MG tablet, Take 500 mg by mouth 2 times daily  ibuprofen (ADVIL/MOTRIN) 200 MG tablet, Take 200 mg by mouth every 6 hours as needed for mild pain        Exam:  /75   Pulse 122   Temp 101.7  F (38.7  C) (Oral)   Resp 16   Ht 1.702 m (5' 7\")   Wt 63.5 kg (140 lb)   LMP 12/30/2019   SpO2 100%   BMI 21.93 kg/m        All vital signs were reviewed.  GENERAL APPEARENCE: A/O x4. NAD.  SKIN: Clean, dry, and intact without visible lesions, rash, jaundice, cyanosis, erythema, ecchymoses to exposed areas.  HEENT: NCAT w/out masses, lesions, or abnormalities. Sclera anicteric, PERRLA, EOMI.  Oral mucosa pink and moist without erythema, exudate, lesions, ulcerations, or thrush. Teeth and gums normal.    NECK: Supple, no masses. No jugular venous distention.   CARDIOVASCULAR: S1, S2 RRR. No murmurs, rubs, or gallops.   RESPIRATORY: Respiratory effort WNL. CTA  bilaterally without crackles/rales/wheeze   GI: Active BS in all 4 quadrants. Abdomen soft, TTP to suprapubic region.   No masses or hepatosplenomegaly.  : " Deferred  MUSCULOSKELETAL:Extremities normal, no gross deformities noted, non-tender to palpation.   PV: 2+ bilateral radial and pedal pulses. No edema noted.   NEURO: CN II-XII grossly intact. Speech normal. Appropriate throughout interview.   HEME/LYMPH: No visible bleeding.  PSYCHIATRIC: Mentation and affect appear normal  VASCULAR ACCESS: CDI without erythema or discharge. Non-tender.    Data:    Results for orders placed or performed during the hospital encounter of 01/26/20   US Renal Complete     Status: None    Narrative    EXAMINATION: US RENAL COMPLETE  1/26/2020 2:31 PM      CLINICAL HISTORY: UTI with fever eval for obstructive changes.    COMPARISON: None.    FINDINGS:    Right kidney: Right renal length: 11 cm. The right kidney is normal in  position and the renal parenchyma is mildly heterogeneous. There is no  evident calculus or renal scarring. No hydronephrosis.    Left kidney: Left renal length: 12.7 cm. The left kidney is normal in  position and the renal parenchyma is mildly heterogeneous. There is no  evident calculus or renal scarring. No hydronephrosis.     The urinary bladder is incompletely distended and poorly visualized.  Small free fluid in the pelvis.            Impression    IMPRESSION:  1. No hydronephrosis. The renal parenchyma is mildly heterogeneous  bilaterally, which can be seen in pyelonephritis.  2. Small volume of pelvic free fluid.    I have personally reviewed the examination and initial interpretation  and I agree with the findings.    IGLESIA KOEHLER MD   CBC with platelets differential     Status: Abnormal   Result Value Ref Range    WBC 15.8 (H) 4.0 - 11.0 10e9/L    RBC Count 4.95 3.8 - 5.2 10e12/L    Hemoglobin 15.0 11.7 - 15.7 g/dL    Hematocrit 45.3 35.0 - 47.0 %    MCV 92 78 - 100 fl    MCH 30.3 26.5 - 33.0 pg    MCHC 33.1 31.5 - 36.5 g/dL    RDW 12.3 10.0 - 15.0 %    Platelet Count 282 150 - 450 10e9/L    Diff Method Manual Differential     % Neutrophils 89.6 %    %  Lymphocytes 5.2 %    % Monocytes 4.3 %    % Eosinophils 0.0 %    % Basophils 0.9 %    Absolute Neutrophil 14.2 (H) 1.6 - 8.3 10e9/L    Absolute Lymphocytes 0.8 0.8 - 5.3 10e9/L    Absolute Monocytes 0.7 0.0 - 1.3 10e9/L    Absolute Eosinophils 0.0 0.0 - 0.7 10e9/L    Absolute Basophils 0.1 0.0 - 0.2 10e9/L    RBC Morphology Normal     Platelet Estimate Confirming automated cell count    Comprehensive metabolic panel     Status: Abnormal   Result Value Ref Range    Sodium 133 133 - 144 mmol/L    Potassium 3.6 3.4 - 5.3 mmol/L    Chloride 100 94 - 109 mmol/L    Carbon Dioxide 27 20 - 32 mmol/L    Anion Gap 6 3 - 14 mmol/L    Glucose 129 (H) 70 - 99 mg/dL    Urea Nitrogen 13 7 - 30 mg/dL    Creatinine 1.11 (H) 0.52 - 1.04 mg/dL    GFR Estimate 70 >60 mL/min/[1.73_m2]    GFR Estimate If Black 81 >60 mL/min/[1.73_m2]    Calcium 9.5 8.5 - 10.1 mg/dL    Bilirubin Total 1.6 (H) 0.2 - 1.3 mg/dL    Albumin 3.6 3.4 - 5.0 g/dL    Protein Total 8.2 6.8 - 8.8 g/dL    Alkaline Phosphatase 107 40 - 150 U/L    ALT 12 0 - 50 U/L    AST 6 0 - 45 U/L   HCG qualitative urine     Status: None   Result Value Ref Range    HCG Qual Urine Negative NEG^Negative   UA with Microscopic reflex to Culture     Status: Abnormal   Result Value Ref Range    Color Urine Yellow     Appearance Urine Slightly Cloudy     Glucose Urine Negative NEG^Negative mg/dL    Bilirubin Urine Small (A) NEG^Negative    Ketones Urine Negative NEG^Negative mg/dL    Specific Gravity Urine 1.021 1.003 - 1.035    Blood Urine Large (A) NEG^Negative    pH Urine 5.5 5.0 - 7.0 pH    Protein Albumin Urine 30 (A) NEG^Negative mg/dL    Urobilinogen mg/dL 2.0 0.0 - 2.0 mg/dL    Nitrite Urine Negative NEG^Negative    Leukocyte Esterase Urine Small (A) NEG^Negative    Source Midstream Urine     WBC Urine 14 (H) 0 - 5 /HPF    RBC Urine 56 (H) 0 - 2 /HPF    Squamous Epithelial /HPF Urine 6 (H) 0 - 1 /HPF    Mucous Urine Present (A) NEG^Negative /LPF    Granular Casts 14 (A)  "NEG^Negative /LPF   Lactic acid whole blood     Status: None   Result Value Ref Range    Lactic Acid 1.5 0.7 - 2.0 mmol/L   Blood culture     Status: None (Preliminary result)   Result Value Ref Range    Specimen Description Blood Left Arm     Special Requests Aerobic and anaerobic bottles received     Culture Micro No growth after 3 hours    Urine Culture Aerobic Bacterial     Status: None (Preliminary result)   Result Value Ref Range    Specimen Description Unspecified Urine     Culture Micro PENDING    Wet prep     Status: None   Result Value Ref Range    Specimen Description Vagina     Wet Prep No motile Trichomonas seen     Wet Prep No yeast seen     Wet Prep No clue cells seen     Wet Prep Rare  PMNs seen         Assessment/Plan: Karen Aguayo is a 24 year old otherwise healthy female who presented to the ED for suprapubic abdominal pain, nausea, vomiting, diarrhea, and syncopal episode.   Per care everywhere, she was seen at [facility] on 1/25 and was diagnosed with UTI/Pyelpnephritis.       ##UTI/Pyelonehphritis:   ##SIRS/Sepsis (Leukocytosis, Fever, Tachycardia):    She was seen at urgent care yesterday and was told she has a UTI and pyelonephritis. She was given Rocephin and discharge on Cipro. Now presenting to the ED with suprapubic abdominal pain, nausea, vomiting, diarrhea, and syncopal episode. In the ED, WBC was 15.8, creatinine was 1.11, lactic acid was 1.5, UA showed 14 WBC's, small LE, negative nitrites. UA consistent with infection here, but slightly improved from UA on 1/25. Renal US showed, \" 1. No hydronephrosis. The renal parenchyma is mildly heterogeneous bilaterally, which can be seen in pyelonephritis. 2. Small volume of pelvic free fluid.\" She was tachy to 130 and febrile to 101.7 in the ED. Blood pressure is stable. She is admitted to observation for monitoring and treatment of Pyelonephritis  -[place] to observation  -Resume Rocephin and Cipro  -Tylenol PRN fever  -Pain " management  -Nausea management   -follow BC  -Follow UC  -Recheck CBC this evening and BMP in the am     ##Nausea, Vomiting:   ##Diarrhea:   ##Abdominal Pain:  Abdominal Pain is suprapubic. Pelvic exam in the ED without cervical motion tenderness or discharge. She also notes nausea, vomiting, and diarrhea that started this am. Differential: UTI/Pyelonphritis, gastroenteritis, antibiotic related, STI/PUD, versus other.  Exam not consistent with SBO. Passing flatus, + BS. Appendicitis is on the differential. However, pain is not localized to RLQ and we do have an explanation for pain. WBC was 24.0 in urgent care and tending down here, which is reassuring.   -Serial abdominal exams  -ADAT  -recheck CBC this evening, if tending up consider CT to evaluate for appendicitis   -Follow GC, Chlamydia cultures   -Stool for C. Diff and culture   -Enteric precautions until stools studies back   -Serial abdominal exam  -Follow-up Pelvic US     ##Syncope with Collapse: While sitting on the toilet this am after vomiting. No preceding chest pain/pressure, palpitations. No loss of bowel or bladder control. No convulsion. No post-ictal status. Suspect vasovagal syncope, orthostatic hypotension, versus infection related. Given age and no family history of early cardiac disease/death, cardiac etiology unlikely.  -TELE  -orthostatic vital signs   -EKG  -Consider work-up with troponin and ECHO if not improving. However, low suspicion     ##TOM: Creatinine 1.11, likely related to poor PO intake and infection.   -IVF  -Recheck in the am     FEN:  -ADAT  -Monitor BMP     Prophy:  -No VTE prophy as patient is up ad emily and anticipate short observation stay   -Encourage ambulation as tolerated     Consults: N/A    CODE STATUS:  FULL CODE     DISPOSITION: Pending improved symptoms and ability to tolerated PO intake. Anticipate < 2 midnights, register to observation.     Celina Rodarte APRN, CNP  Emergency Department Observation Unit

## 2020-01-26 NOTE — ED PROVIDER NOTES
[facility]ERGENCY DEPARTMENT ([facility])  1/26/20    History     Chief Complaint   Patient presents with     UTI     The history is provided by the patient and medical records.     Karen Aguayo is a 24 year old otherwise healthy female who presents to the Emergency Department for suprapubic abdominal pain. Patient reports she was seen at urgent care in [place] yesterday and was told she has a UTI and pyelonephritis. Patient states she was given a shot and antibiotic at urgent care. She reports she was able to take one dose of her medication yesterday, but was unable to today, because she has been vomiting. Patient reports a fever of 102.5 two nights ago, and states she has been taking ibuprofen and Tylenol. Patient reports she had a syncopal episode this morning and still feels dizzy and fatigued. Patient states she also has nausea and urinary frequency. Patient denies flank pain, back pain, or vaginal discharge. Patient reports she is sexually active. Patient reports she is currently having her menstrual cycle. Patient denies surgical history or history of kidney stones.  Patient history of IUD placed a month ago.  Denies history of sexually transmitted infections.  Has some diarrhea reported gas also.  Fevers noted no nausea vomiting no flank pain.  No rash.  Currently having her period is noted which is normal.    I have reviewed the Medications, Allergies, Past Medical and Surgical History, and Social History in the Epic system.    History reviewed. No pertinent past medical history.    Past Surgical History:   Procedure Laterality Date     OPEN REDUCTION INTERNAL FIXATION WRIST Right 5/15/2018    Procedure: OPEN REDUCTION INTERNAL FIXATION WRIST;  Open Reduction Internal Fixation Right Distal Radius Fracture ;  Surgeon: Ezio Yin MD;  Location:  OR       No family history on file.    Social History     Tobacco Use     Smoking status: Never Smoker     Smokeless tobacco: Never Used  "  Substance Use Topics     Alcohol use: Not on file       Current Facility-Administered Medications   Medication     acetaminophen (TYLENOL) tablet 650 mg     ciprofloxacin (CIPRO) infusion 400 mg     ibuprofen (ADVIL/MOTRIN) tablet 200 mg     lidocaine (LMX4) cream     lidocaine (LMX4) cream     lidocaine 1 % 0.1-1 mL     lidocaine 1 % 0.1-1 mL     naloxone (NARCAN) injection 0.1-0.4 mg     ondansetron (ZOFRAN-ODT) ODT tab 4 mg    Or     ondansetron (ZOFRAN) injection 4 mg     oxyCODONE-acetaminophen (PERCOCET) 5-325 MG per tablet 1-2 tablet     pantoprazole (PROTONIX) 40 mg IV push injection     senna-docusate (SENOKOT-S/PERICOLACE) 8.6-50 MG per tablet 1-2 tablet     sodium chloride (PF) 0.9% PF flush 3 mL     sodium chloride (PF) 0.9% PF flush 3 mL     sodium chloride (PF) 0.9% PF flush 3 mL     sodium chloride (PF) 0.9% PF flush 3 mL     sodium chloride 0.9% infusion      No Known Allergies    Review of Systems   Constitutional: Positive for fatigue and fever (resolved).   HENT: Negative for sinus pressure and sinus pain.    Respiratory: Negative for shortness of breath.    Gastrointestinal: Positive for abdominal pain (suprapubic), nausea and vomiting.   Genitourinary: Positive for dysuria, frequency and vaginal bleeding (normal menses). Negative for flank pain and vaginal discharge.   Musculoskeletal: Negative for back pain and neck pain.   Skin: Negative for rash.   Allergic/Immunologic: Negative for immunocompromised state.   Neurological: Positive for dizziness, weakness and light-headedness. Negative for syncope.   Hematological: Does not bruise/bleed easily.   Psychiatric/Behavioral: Positive for decreased concentration and dysphoric mood. Negative for confusion.   All other systems reviewed and are negative.      Physical Exam   BP: 99/50  Pulse: 130  Temp: 97.8  F (36.6  C)  Resp: 16  Height: 170.2 cm (5' 7\")  Weight: 63.5 kg (140 lb)  SpO2: 97 %  Lying Orthostatic BP: 117/70  Lying Orthostatic Pulse: " 120 bpm  Sitting Orthostatic BP: 124/73  Sitting Orthostatic Pulse: 127 bpm  Standing Orthostatic BP: 101/73  Standing Orthostatic Pulse: 138 bpm      Physical Exam  Vitals signs and nursing note reviewed.   Constitutional:       General: She is in acute distress.      Appearance: She is well-developed. She is not toxic-appearing or diaphoretic.      Comments: General patient here does not appear to be toxic is pleasant is not writhing in pain.  Does feel slightly uncomfortable not confused.  Family present also.   HENT:      Head: Normocephalic and atraumatic.      Nose: Nose normal.      Mouth/Throat:      Mouth: Mucous membranes are dry.   Eyes:      General: No scleral icterus.     Extraocular Movements: Extraocular movements intact.      Conjunctiva/sclera: Conjunctivae normal.      Pupils: Pupils are equal, round, and reactive to light.   Neck:      Musculoskeletal: Normal range of motion and neck supple.   Cardiovascular:      Rate and Rhythm: Regular rhythm. Tachycardia present.      Comments: Sinus tachycardia noted  Pulmonary:      Effort: No respiratory distress.      Breath sounds: No stridor.   Abdominal:      General: There is no distension.      Palpations: Abdomen is soft. There is no mass.      Tenderness: There is abdominal tenderness. There is no guarding or rebound.      Hernia: No hernia is present.      Comments: Patient examination several times reveals suprapubic tenderness primarily there is some slight tenderness in the left lateral suprapubic region otherwise no mass rebound or guarding noted.  No rash   Genitourinary:     Comments: Pelvic exam done by myself along with the fourth her medical student with nurse present revealed some normal vaginal bleeding without exsanguination.  Cultures done for GC chlamydia and wet prep.  Bimanual examination no cervical motion tenderness noted but slight tenderness in the left adnexa.  Musculoskeletal:         General: No swelling or tenderness.    Skin:     General: Skin is warm and dry.      Capillary Refill: Capillary refill takes less than 2 seconds.      Coloration: Skin is not jaundiced or pale.      Findings: No erythema or rash.   Neurological:      General: No focal deficit present.      Mental Status: She is alert and oriented to person, place, and time.   Psychiatric:         Mood and Affect: Mood normal.         Behavior: Behavior normal.         Thought Content: Thought content normal.         Judgment: Judgment normal.         ED Course        Procedures         Patient ER was evaluated.  IV established fluids were given continually monitored 2 L of fluids given.  Tylenol for fever given the ER also.  Vital signs remained stable some improvement patient here in the ER had laboratory testing done.  Urinalysis does show red cells and white cells leukocyte esterase.  Patient's laboratory testing white count is decreased from 20,000 down to 15,000.  Creatinine 1.11.  Lactic acid within normal limits.  Blood culture sent also.  In the ER initially we did do a renal ultrasound with the concern of UTI etc. and renal ultrasound did not show any hydronephrosis but there is some changes bilateral in the parenchyma concerning for pyelonephritis.  No perinephric fluid identified on ultrasound.  With this thought we gave another dose of ceftriaxone at this point we were later able to retrieve initial labs from the urgent care at [facility]  In the ER we did do a pelvic exam as noted with  chlamydia and wet prep sent.  Some tenderness in left adnexa.  Ultrasound was done with Doppler study.  The findings reveal enlarged left ovary with some fluid next to it with a volume of 33 mL.  This was reported to be by the radiologist there is blood flow to this ovary but concern for possible intermittent torsion.  Consultation with GYN resident here on the [facility] she did come down to the ER but at that point patient had been admitted to the ED  observation already.  She will see the patient upstairs I talked to the [facility]EN[facility] also regarding this and will continue to coordinate evaluation of patient on the ED observation unit and make recommendations.         Critical Care time:  none             Labs Ordered and Resulted from Time of ED Arrival Up to the Time of Departure from the ED   CBC WITH PLATELETS DIFFERENTIAL - Abnormal; Notable for the following components:       Result Value    WBC 15.8 (*)     Absolute Neutrophil 14.2 (*)     All other components within normal limits   COMPREHENSIVE METABOLIC PANEL - Abnormal; Notable for the following components:    Glucose 129 (*)     Creatinine 1.11 (*)     Bilirubin Total 1.6 (*)     All other components within normal limits   ROUTINE UA WITH MICROSCOPIC REFLEX TO CULTURE - Abnormal; Notable for the following components:    Bilirubin Urine Small (*)     Blood Urine Large (*)     Protein Albumin Urine 30 (*)     Leukocyte Esterase Urine Small (*)     WBC Urine 14 (*)     RBC Urine 56 (*)     Squamous Epithelial /HPF Urine 6 (*)     Mucous Urine Present (*)     Granular Casts 14 (*)     All other components within normal limits   HCG QUALITATIVE URINE   LACTIC ACID WHOLE BLOOD   PULSE OXIMETRY NURSING   CARDIAC CONTINUOUS MONITORING   PERIPHERAL IV CATHETER   ORTHOSTATIC BLOOD PRESSURE AND PULSE   PATIENT CARE ORDER   TELEMETRY MONITORING CARDIOLOGY ADULT   BLOOD CULTURE   URINE CULTURE AEROBIC BACTERIAL   CHLAMYDIA TRACHOMATIS PCR   NEISSERIA GONORRHOEAE PCR   WET PREP   CLOSTRIDIUM DIFFICILE TOXIN B   ENTERIC BACTERIA AND VIRUS PANEL BY WAYNE STOOL     Results for orders placed or performed during the hospital encounter of 01/26/20   US Renal Complete     Status: None    Narrative    EXAMINATION: US RENAL COMPLETE  1/26/2020 2:31 PM      CLINICAL HISTORY: UTI with fever eval for obstructive changes.    COMPARISON: None.    FINDINGS:    Right kidney: Right renal length: 11 cm. The right kidney is normal in  position  and the renal parenchyma is mildly heterogeneous. There is no  evident calculus or renal scarring. No hydronephrosis.    Left kidney: Left renal length: 12.7 cm. The left kidney is normal in  position and the renal parenchyma is mildly heterogeneous. There is no  evident calculus or renal scarring. No hydronephrosis.     The urinary bladder is incompletely distended and poorly visualized.  Small free fluid in the pelvis.            Impression    IMPRESSION:  1. No hydronephrosis. The renal parenchyma is mildly heterogeneous  bilaterally, which can be seen in pyelonephritis.  2. Small volume of pelvic free fluid.    I have personally reviewed the examination and initial interpretation  and I agree with the findings.    IGLESIA KOEHLER MD   US Pelvis Cmplt w Transvag & Doppler LmtPel Duplex Limited     Status: Abnormal (Preliminary result)   Result Value Ref Range    Radiologist flags Possible ovarian torsion (Urgent)     Narrative    EXAMINATION: Body ultrasound pelvis , 1/26/2020 7:10 PM     COMPARISON: None.    HISTORY: UTI with IUD with pelvic pain, left greater than right,  evaluate for torsion or mass    TECHNIQUE: The pelvis was scanned in standard fashion with  transabdominal and transvaginal transducer(s) using both grey scale  and color Doppler techniques.    FINDINGS  The uterus measures 7.5 x 2.9 x 4.7 cm, and there is no evidence of a  focal fibroid.  The endometrium is within normal limits and measures 6  mm. There is no free fluid in the pelvis.    The right ovary measures 5.1 x 1.7 x 2.6 cm with a volume of 11.7 mL  and the left ovary measures 3.0 x 4.5 x 4.8 cm, with a volume of 33.7  mL. There is no adnexal mass. There is normal arterial blood flow to  the ovaries. The left ovary is hypoechoic. There is complex fluid  adjacent to the left ovary.        Impression    IMPRESSION:   Edematous, asymmetrically enlarged left ovary with a volume of 33.7 mm  and adjacent complex fluid. While there is  documented left ovarian  arterial blood flow, findings are concerning for intermittent torsion.      [Urgent Result: Possible ovarian torsion]    Finding was identified on 1/26/2020 7:01 PM.     Dr. Ng was contacted by Dr. Hogue at 1/26/2020 7:12 PM and  verbalized understanding of the urgent finding.    CBC with platelets differential     Status: Abnormal   Result Value Ref Range    WBC 15.8 (H) 4.0 - 11.0 10e9/L    RBC Count 4.95 3.8 - 5.2 10e12/L    Hemoglobin 15.0 11.7 - 15.7 g/dL    Hematocrit 45.3 35.0 - 47.0 %    MCV 92 78 - 100 fl    MCH 30.3 26.5 - 33.0 pg    MCHC 33.1 31.5 - 36.5 g/dL    RDW 12.3 10.0 - 15.0 %    Platelet Count 282 150 - 450 10e9/L    Diff Method Manual Differential     % Neutrophils 89.6 %    % Lymphocytes 5.2 %    % Monocytes 4.3 %    % Eosinophils 0.0 %    % Basophils 0.9 %    Absolute Neutrophil 14.2 (H) 1.6 - 8.3 10e9/L    Absolute Lymphocytes 0.8 0.8 - 5.3 10e9/L    Absolute Monocytes 0.7 0.0 - 1.3 10e9/L    Absolute Eosinophils 0.0 0.0 - 0.7 10e9/L    Absolute Basophils 0.1 0.0 - 0.2 10e9/L    RBC Morphology Normal     Platelet Estimate Confirming automated cell count    Comprehensive metabolic panel     Status: Abnormal   Result Value Ref Range    Sodium 133 133 - 144 mmol/L    Potassium 3.6 3.4 - 5.3 mmol/L    Chloride 100 94 - 109 mmol/L    Carbon Dioxide 27 20 - 32 mmol/L    Anion Gap 6 3 - 14 mmol/L    Glucose 129 (H) 70 - 99 mg/dL    Urea Nitrogen 13 7 - 30 mg/dL    Creatinine 1.11 (H) 0.52 - 1.04 mg/dL    GFR Estimate 70 >60 mL/min/[1.73_m2]    GFR Estimate If Black 81 >60 mL/min/[1.73_m2]    Calcium 9.5 8.5 - 10.1 mg/dL    Bilirubin Total 1.6 (H) 0.2 - 1.3 mg/dL    Albumin 3.6 3.4 - 5.0 g/dL    Protein Total 8.2 6.8 - 8.8 g/dL    Alkaline Phosphatase 107 40 - 150 U/L    ALT 12 0 - 50 U/L    AST 6 0 - 45 U/L   HCG qualitative urine     Status: None   Result Value Ref Range    HCG Qual Urine Negative NEG^Negative   UA with Microscopic reflex to Culture     Status:  Abnormal   Result Value Ref Range    Color Urine Yellow     Appearance Urine Slightly Cloudy     Glucose Urine Negative NEG^Negative mg/dL    Bilirubin Urine Small (A) NEG^Negative    Ketones Urine Negative NEG^Negative mg/dL    Specific Gravity Urine 1.021 1.003 - 1.035    Blood Urine Large (A) NEG^Negative    pH Urine 5.5 5.0 - 7.0 pH    Protein Albumin Urine 30 (A) NEG^Negative mg/dL    Urobilinogen mg/dL 2.0 0.0 - 2.0 mg/dL    Nitrite Urine Negative NEG^Negative    Leukocyte Esterase Urine Small (A) NEG^Negative    Source Midstream Urine     WBC Urine 14 (H) 0 - 5 /HPF    RBC Urine 56 (H) 0 - 2 /HPF    Squamous Epithelial /HPF Urine 6 (H) 0 - 1 /HPF    Mucous Urine Present (A) NEG^Negative /LPF    Granular Casts 14 (A) NEG^Negative /LPF   Lactic acid whole blood     Status: None   Result Value Ref Range    Lactic Acid 1.5 0.7 - 2.0 mmol/L   Blood culture     Status: None (Preliminary result)   Result Value Ref Range    Specimen Description Blood Left Arm     Special Requests Aerobic and anaerobic bottles received     Culture Micro No growth after 3 hours    Urine Culture Aerobic Bacterial     Status: None (Preliminary result)   Result Value Ref Range    Specimen Description Unspecified Urine     Culture Micro PENDING    Wet prep     Status: None   Result Value Ref Range    Specimen Description Vagina     Wet Prep No motile Trichomonas seen     Wet Prep No yeast seen     Wet Prep No clue cells seen     Wet Prep Rare  PMNs seen               Assessments & Plan (with Medical Decision Making)  24-year-old female who is not pregnant presents the ER with few days of fever with dysuric symptoms and suprapubic pain.  Patient does not history of UTIs.  She had an IUD placed a month ago currently having her menstrual flow which apparently is normal she is sexually active but her pregnancy test was negative.  Patient evaluated here in the ER after she was seen last evening at urgent care at [facility] given a dose of  ceftriaxone and started on Cipro for a UTI white count at that point was 20,000 I believe.  Patient presented now here for evaluation IV established fluids given she is tachycardic had a fever given Tylenol for this.  She remained relatively stable in the ER without significant decompensation.  Patient had labs done white count was down to 15,000 urinalysis also showed still infection.  Confirm she is not pregnant.  Creatinine 1.11.  Lactic acid within normal limits.  Patient here in the ER received another dose of ceftriaxone after renal ultrasound done initially showed some parenchymal changes only without hydro-concerning for pyelonephritis.  We are planning to mid to ED observation.  Pelvic exam done some left adnexal fullness tenderness noted.  Ultrasound with Doppler did not show abnormal blood flow but showed enlargement of left ovary with some periovarian fluid with a volume of 33 mils.  Consultation with GYN to evaluate for intermittent torsion.  Patient initially was transferred to the observation unit where GYN will see the patient I relayed this to the [facility] also and will coordinate GYN recommendations.           I have reviewed the nursing notes.    I have reviewed the findings, diagnosis, plan and need for follow up with the patient.    Current Discharge Medication List          Final diagnoses:   Pyelonephritis, acute   Left ovarian enlargement   Suprapubic pain   Fever and chills     IMine, am serving as a trained medical scribe to document services personally performed by Selvin Ng MD, based on the provider's statements to me.      ISelvin MD, was physically present and have reviewed and verified the accuracy of this note documented by Mine Carlson.     1/26/2020   [facility], [place], EMERGENCY DEPARTMENT     Selvin Ng MD  01/26/20 1954

## 2020-01-27 ENCOUNTER — APPOINTMENT (OUTPATIENT)
Dept: CT IMAGING | Facility: CLINIC | Age: 25
End: 2020-01-27
Attending: INTERNAL MEDICINE
Payer: COMMERCIAL

## 2020-01-27 PROBLEM — N70.93 TOA (TUBO-OVARIAN ABSCESS): Status: ACTIVE | Noted: 2020-01-27

## 2020-01-27 LAB
ANION GAP SERPL CALCULATED.3IONS-SCNC: 7 MMOL/L (ref 3–14)
BACTERIA SPEC CULT: NO GROWTH
BASOPHILS # BLD AUTO: 0.1 10E9/L (ref 0–0.2)
BASOPHILS NFR BLD AUTO: 0.6 %
BUN SERPL-MCNC: 10 MG/DL (ref 7–30)
C COLI+JEJUNI+LARI FUSA STL QL NAA+PROBE: NOT DETECTED
C TRACH DNA SPEC QL NAA+PROBE: NEGATIVE
CALCIUM SERPL-MCNC: 9 MG/DL (ref 8.5–10.1)
CHLORIDE SERPL-SCNC: 106 MMOL/L (ref 94–109)
CO2 SERPL-SCNC: 21 MMOL/L (ref 20–32)
CREAT SERPL-MCNC: 0.79 MG/DL (ref 0.52–1.04)
DIFFERENTIAL METHOD BLD: ABNORMAL
EC STX1 GENE STL QL NAA+PROBE: NOT DETECTED
EC STX2 GENE STL QL NAA+PROBE: NOT DETECTED
ENTERIC PATHOGEN COMMENT: NORMAL
EOSINOPHIL # BLD AUTO: 0 10E9/L (ref 0–0.7)
EOSINOPHIL NFR BLD AUTO: 0.1 %
ERYTHROCYTE [DISTWIDTH] IN BLOOD BY AUTOMATED COUNT: 12.4 % (ref 10–15)
GFR SERPL CREATININE-BSD FRML MDRD: >90 ML/MIN/{1.73_M2}
GLUCOSE SERPL-MCNC: 106 MG/DL (ref 70–99)
HCT VFR BLD AUTO: 37.7 % (ref 35–47)
HGB BLD-MCNC: 12.3 G/DL (ref 11.7–15.7)
IMM GRANULOCYTES # BLD: 0.1 10E9/L (ref 0–0.4)
IMM GRANULOCYTES NFR BLD: 0.5 %
INTERPRETATION ECG - MUSE: NORMAL
LACTATE BLD-SCNC: 0.5 MMOL/L (ref 0.7–2)
LYMPHOCYTES # BLD AUTO: 0.8 10E9/L (ref 0.8–5.3)
LYMPHOCYTES NFR BLD AUTO: 5.5 %
MCH RBC QN AUTO: 30.6 PG (ref 26.5–33)
MCHC RBC AUTO-ENTMCNC: 32.6 G/DL (ref 31.5–36.5)
MCV RBC AUTO: 94 FL (ref 78–100)
MONOCYTES # BLD AUTO: 1 10E9/L (ref 0–1.3)
MONOCYTES NFR BLD AUTO: 6.8 %
N GONORRHOEA DNA SPEC QL NAA+PROBE: NEGATIVE
NEUTROPHILS # BLD AUTO: 12.4 10E9/L (ref 1.6–8.3)
NEUTROPHILS NFR BLD AUTO: 86.5 %
NOROV GI+II ORF1-ORF2 JNC STL QL NAA+PR: NOT DETECTED
NRBC # BLD AUTO: 0 10*3/UL
NRBC BLD AUTO-RTO: 0 /100
PLATELET # BLD AUTO: 259 10E9/L (ref 150–450)
POTASSIUM SERPL-SCNC: 3.5 MMOL/L (ref 3.4–5.3)
PROCALCITONIN SERPL-MCNC: 28.63 NG/ML
RADIOLOGIST FLAGS: ABNORMAL
RBC # BLD AUTO: 4.02 10E12/L (ref 3.8–5.2)
RVA NSP5 STL QL NAA+PROBE: NOT DETECTED
SALMONELLA SP RPOD STL QL NAA+PROBE: NOT DETECTED
SHIGELLA SP+EIEC IPAH STL QL NAA+PROBE: NOT DETECTED
SODIUM SERPL-SCNC: 134 MMOL/L (ref 133–144)
SPECIMEN SOURCE: NORMAL
V CHOL+PARA RFBL+TRKH+TNAA STL QL NAA+PR: NOT DETECTED
WBC # BLD AUTO: 14.3 10E9/L (ref 4–11)
Y ENTERO RECN STL QL NAA+PROBE: NOT DETECTED

## 2020-01-27 PROCEDURE — G0378 HOSPITAL OBSERVATION PER HR: HCPCS

## 2020-01-27 PROCEDURE — 85025 COMPLETE CBC W/AUTO DIFF WBC: CPT | Performed by: NURSE PRACTITIONER

## 2020-01-27 PROCEDURE — 25000132 ZZH RX MED GY IP 250 OP 250 PS 637: Performed by: STUDENT IN AN ORGANIZED HEALTH CARE EDUCATION/TRAINING PROGRAM

## 2020-01-27 PROCEDURE — 25000132 ZZH RX MED GY IP 250 OP 250 PS 637: Performed by: NURSE PRACTITIONER

## 2020-01-27 PROCEDURE — 99207 ZZC CDG-MDM COMPONENT: MEETS LOW - DOWN CODED: CPT | Performed by: INTERNAL MEDICINE

## 2020-01-27 PROCEDURE — 25000128 H RX IP 250 OP 636: Performed by: STUDENT IN AN ORGANIZED HEALTH CARE EDUCATION/TRAINING PROGRAM

## 2020-01-27 PROCEDURE — 80048 BASIC METABOLIC PNL TOTAL CA: CPT | Performed by: NURSE PRACTITIONER

## 2020-01-27 PROCEDURE — C9113 INJ PANTOPRAZOLE SODIUM, VIA: HCPCS | Performed by: NURSE PRACTITIONER

## 2020-01-27 PROCEDURE — 12000001 ZZH R&B MED SURG/OB UMMC

## 2020-01-27 PROCEDURE — 25800030 ZZH RX IP 258 OP 636: Performed by: FAMILY MEDICINE

## 2020-01-27 PROCEDURE — 25800030 ZZH RX IP 258 OP 636: Performed by: STUDENT IN AN ORGANIZED HEALTH CARE EDUCATION/TRAINING PROGRAM

## 2020-01-27 PROCEDURE — 83605 ASSAY OF LACTIC ACID: CPT | Performed by: EMERGENCY MEDICINE

## 2020-01-27 PROCEDURE — 99231 SBSQ HOSP IP/OBS SF/LOW 25: CPT | Performed by: INTERNAL MEDICINE

## 2020-01-27 PROCEDURE — 36415 COLL VENOUS BLD VENIPUNCTURE: CPT | Performed by: EMERGENCY MEDICINE

## 2020-01-27 PROCEDURE — 36415 COLL VENOUS BLD VENIPUNCTURE: CPT | Performed by: NURSE PRACTITIONER

## 2020-01-27 PROCEDURE — 74177 CT ABD & PELVIS W/CONTRAST: CPT

## 2020-01-27 PROCEDURE — 84145 PROCALCITONIN (PCT): CPT | Performed by: NURSE PRACTITIONER

## 2020-01-27 PROCEDURE — 25000128 H RX IP 250 OP 636: Performed by: PHYSICIAN ASSISTANT

## 2020-01-27 PROCEDURE — 25000128 H RX IP 250 OP 636: Performed by: NURSE PRACTITIONER

## 2020-01-27 PROCEDURE — 96376 TX/PRO/DX INJ SAME DRUG ADON: CPT

## 2020-01-27 RX ORDER — LOPERAMIDE HCL 2 MG
2 CAPSULE ORAL 3 TIMES DAILY PRN
Status: DISCONTINUED | OUTPATIENT
Start: 2020-01-27 | End: 2020-01-29 | Stop reason: HOSPADM

## 2020-01-27 RX ORDER — PIPERACILLIN SODIUM, TAZOBACTAM SODIUM 3; .375 G/15ML; G/15ML
3.38 INJECTION, POWDER, LYOPHILIZED, FOR SOLUTION INTRAVENOUS EVERY 6 HOURS
Status: DISCONTINUED | OUTPATIENT
Start: 2020-01-27 | End: 2020-01-27

## 2020-01-27 RX ORDER — CEFTRIAXONE 1 G/1
1 INJECTION, POWDER, FOR SOLUTION INTRAMUSCULAR; INTRAVENOUS EVERY 12 HOURS
Status: DISCONTINUED | OUTPATIENT
Start: 2020-01-27 | End: 2020-01-27

## 2020-01-27 RX ORDER — OXYCODONE AND ACETAMINOPHEN 5; 325 MG/1; MG/1
1 TABLET ORAL EVERY 6 HOURS PRN
Status: DISCONTINUED | OUTPATIENT
Start: 2020-01-27 | End: 2020-01-29 | Stop reason: HOSPADM

## 2020-01-27 RX ORDER — CEFOXITIN 2 G/1
2 INJECTION, POWDER, FOR SOLUTION INTRAVENOUS EVERY 6 HOURS
Status: DISCONTINUED | OUTPATIENT
Start: 2020-01-27 | End: 2020-01-28

## 2020-01-27 RX ORDER — DOXYCYCLINE 100 MG/1
100 CAPSULE ORAL EVERY 12 HOURS SCHEDULED
Status: DISCONTINUED | OUTPATIENT
Start: 2020-01-27 | End: 2020-01-29 | Stop reason: HOSPADM

## 2020-01-27 RX ORDER — IOPAMIDOL 755 MG/ML
85 INJECTION, SOLUTION INTRAVASCULAR ONCE
Status: COMPLETED | OUTPATIENT
Start: 2020-01-27 | End: 2020-01-27

## 2020-01-27 RX ADMIN — METRONIDAZOLE 500 MG: 500 INJECTION, SOLUTION INTRAVENOUS at 13:59

## 2020-01-27 RX ADMIN — METRONIDAZOLE 500 MG: 500 INJECTION, SOLUTION INTRAVENOUS at 21:46

## 2020-01-27 RX ADMIN — CEFTRIAXONE 1 G: 1 INJECTION, POWDER, FOR SOLUTION INTRAMUSCULAR; INTRAVENOUS at 06:45

## 2020-01-27 RX ADMIN — ACETAMINOPHEN 650 MG: 325 TABLET, FILM COATED ORAL at 06:58

## 2020-01-27 RX ADMIN — ACETAMINOPHEN 650 MG: 325 TABLET, FILM COATED ORAL at 21:09

## 2020-01-27 RX ADMIN — SODIUM CHLORIDE 1000 ML: 900 INJECTION INTRAVENOUS at 09:24

## 2020-01-27 RX ADMIN — DOXYCYCLINE 100 MG: 100 CAPSULE ORAL at 19:52

## 2020-01-27 RX ADMIN — CEFOXITIN SODIUM 2 G: 2 POWDER, FOR SOLUTION INTRAVENOUS at 19:49

## 2020-01-27 RX ADMIN — ACETAMINOPHEN 650 MG: 325 TABLET, FILM COATED ORAL at 13:20

## 2020-01-27 RX ADMIN — SODIUM CHLORIDE 1000 ML: 900 INJECTION INTRAVENOUS at 00:50

## 2020-01-27 RX ADMIN — PANTOPRAZOLE SODIUM 40 MG: 40 INJECTION, POWDER, FOR SOLUTION INTRAVENOUS at 08:54

## 2020-01-27 RX ADMIN — LOPERAMIDE HYDROCHLORIDE 2 MG: 2 CAPSULE ORAL at 21:09

## 2020-01-27 RX ADMIN — SODIUM CHLORIDE 1000 ML: 900 INJECTION INTRAVENOUS at 19:50

## 2020-01-27 RX ADMIN — IOPAMIDOL 85 ML: 755 INJECTION, SOLUTION INTRAVENOUS at 10:38

## 2020-01-27 RX ADMIN — CEFOXITIN SODIUM 2 G: 2 POWDER, FOR SOLUTION INTRAVENOUS at 13:21

## 2020-01-27 RX ADMIN — DOXYCYCLINE 100 MG: 100 CAPSULE ORAL at 13:20

## 2020-01-27 ASSESSMENT — ACTIVITIES OF DAILY LIVING (ADL)
WHICH_OF_THE_ABOVE_FUNCTIONAL_RISKS_HAD_A_RECENT_ONSET_OR_CHANGE?: FALL HISTORY
DRESS: 0-->INDEPENDENT
ADLS_ACUITY_SCORE: 10
FALL_HISTORY_WITHIN_LAST_SIX_MONTHS: YES
TOILETING: 0-->INDEPENDENT
ADLS_ACUITY_SCORE: 10
TRANSFERRING: 0-->INDEPENDENT
AMBULATION: 0-->INDEPENDENT
COGNITION: 0 - NO COGNITION ISSUES REPORTED
RETIRED_COMMUNICATION: 0-->UNDERSTANDS/COMMUNICATES WITHOUT DIFFICULTY
BATHING: 0-->INDEPENDENT
RETIRED_EATING: 0-->INDEPENDENT
SWALLOWING: 0-->SWALLOWS FOODS/LIQUIDS WITHOUT DIFFICULTY

## 2020-01-27 NOTE — PLAN OF CARE
"Observation Goals    - Diagnostic tests and consults completed and resulted if ordered: not met   - Vital signs normal or at patient baseline: /64 (BP Location: Left arm)   Pulse 122   Temp 98.3  F (36.8  C) (Oral)   Resp 16   Ht 1.702 m (5' 7\")   Wt 63.3 kg (139 lb 9.6 oz)   LMP 12/30/2019   SpO2 99%   BMI 21.86 kg/m      - Tolerating oral intake to maintain hydration: tolerating clears and tolerated apple sauce; IV fluids running   - Adequate pain control on oral analgesia: met; pt is not requesting pain medication at this time   - Tolerating oral antibiotics or has plans for home infusion setup: not met; IV antibiotics ordered    - Infection is improving: in progress  - Safe disposition plan has been identified:not met     Pt is still having frequent diarrhea. Awaiting stool sample results. Pt is up independently to bathroom.   "

## 2020-01-27 NOTE — PROGRESS NOTES
[facility] transport ([phone])  set up for 1830 to transfer pt to unit [facility], [facility] [place].

## 2020-01-27 NOTE — PLAN OF CARE
Outpatient/Observation goals to be met before discharge home:     - Diagnostic tests and consults completed and resulted if ordered -not met  - Vital signs normal or at patient baseline -met  - Tolerating oral intake to maintain hydration -tolerating clears, IV fluids running  - Adequate pain control on oral analgesia -met, tylenol given x1 for pain  - Tolerating oral antibiotics or has plans for home infusion setup -not met, IV abx ordered  - Infection is improving -in progress, afebrile and pain has improved  - Safe disposition plan has been identified -prior living    -pt having diarrhea frequently, stool sample sent, tolerated apple juice and apple sauce, up ind to bathroom, IV fluids running, will continue to monitor

## 2020-01-27 NOTE — PROGRESS NOTES
[facility] Progress Note - Hospitalist Service, Gold 3       Date of Admission:  1/26/2020  Assessment & Plan   Karen Aguayo is a 24 year old female with no significant past medical history who was admitted to ED obs 01/26 for LLQ abdominal pain w/ Sepsis and concern for UTI/pyelo, now transferring to medicine for further cares.     #Sepsis 2/2 left tubo ovarian abscess: WBC count 19.8 w/ ANC of 9.9-->14.3 w/ ANC of 12.4. VS w/ tachycardia, normotension, T max of 101.7. Procal 28.63. Pelvic US w/ normal doppler flow, cannot r/o intermittent torsion, edematous enlarged left ovary w/ adjacent complex fluid, no discrete mass. CT w/ left sided tubo ovarian abscess. Wet prep negative. G/C negative.   -Gyn following, will re evaluate  -Transition antibiotics to cefoxitin, flagyl and doxy per gyn recs  -Continue IVF hydration w/ NS at 125 ml/hour  -Continue PRN percocet for pain  -Continue PRN tylenol for fever  -BC pending  -UC pending    Diet: Advance Diet as Tolerated: Clear Liquid Diet    DVT Prophylaxis: Pneumatic Compression Devices and Ambulate every shift  Schwarz Catheter: not present  Code Status: Full Code      Disposition Plan   Expected discharge: 2 - 3 days, recommended to prior living arrangement once antibiotic plan established and SIRS/Sepsis treated.  Entered: Amanda Jesus PA-C 01/27/2020, 8:32 AM       The patient's care was discussed with the Attending Physician, Dr. Sim, Bedside Nurse, Patient and Gyn Consultant.    Amanda Jesus PA-C  Hospitalist Service, Gold 3 (float HENRI during day)  [facility]  Pager: [phone]  Please see sticky note for cross cover information  ______________________________________________________________________    Interval History   The patient notes she has lower abdominal pain, well controlled. No nausea. Overall improving. Notes no further lightheadedness or syncopal  episodes.     Data reviewed today: I reviewed all medications, new labs and imaging results over the last 24 hours. I personally reviewed no images or EKG's today.    Physical Exam   Vital Signs: Temp: 99.7  F (37.6  C) Temp src: Oral BP: 115/63 Pulse: 122 Heart Rate: 101 Resp: 16 SpO2: 97 % O2 Device: None (Room air)    Weight: 139 lbs 9.6 oz  GENERAL: Alert and oriented x 3.   HEENT: Anicteric sclera. Mucous membranes moist and without lesions.   RESPIRATORY: Effort normal on RA.   MUSCULOSKELETAL: Moves all extremities.   NEUROLOGICAL: No focal deficits.   SKIN: No jaundice. No rashes.       Data   Reviewed BMP, Procal, CBC, glucose

## 2020-01-27 NOTE — CONSULTS
"Gynecology Consult Note    Consulting Physician: Selvin Ng MD  Reason for Consult: Concern for ovarian torsion    HPI:   Karen Aguayo is a 24 year old G0, who presents to the ED with abdominal pain. She reports onset of pain the evening of 1/24 in the left abdomen. Also noted fevers up to 102 degrees. She presented to urgent care yesterday. Workup was remarkable for UA with nitrites, WBC of 24. She was given a dose of ceftriaxone and discharged home with a prescription for ciprofloxacin. She took a single dose last night. This morning, she began having frequent episodes of diarrhea. Also had an episode of nausea and vomiting after which she syncopized. EMS was called, and she was brought to the Emergency Department. In the ED, she was febrile to 101.7 and tachycardic to 120s. WBC is 15.8 and Cr 1.11. Wet prep was negative and GC/CT were collected. Renal ultrasound demonstrated heterogenous parenchyma concerning for pyelonephritis without hydronephrosis. On exam, mild left adnexal tenderness was noted and a pelvic ultrasound obtained. This showed increased volume of left ovary without associated adnexal mass. Normal flow was noted but based on findings there is concern for intermittent ovarian torsion. She was admitted to medicine obs for suspected pyelonephrosis. Ob-Gyn has been consulted given exam and ultrasound findings.    Currently, the patient reports a \"band-like\" pain across her mid abdomen. She describes a dull constant pain with bursts of sharp pain. Severity is currently rated as 1-2/10 but was as high as 7/10 prior. States movement makes the pain worse. Has been taking Tylenol and ibuprofen without significant relief. She has associated urinary frequency and some dysuria. Has been having irregular vaginal bleeding since Mirena IUD placement in 12/2019. Denies abnormal vaginal discharge. She is sexually active with single male partner and is not concerned about STIs. She reports subjective fevers " and chills. No chest pain, shortness of breath.     OBHx:   G0    GynHx:   - LMP: irregular bleeding since Mirena IUD placement in 12/2019  - Pap smear history: Believes she had one at [facility] prior to IUD placement  - Sexually activity: Sexually active with single male partner though does hesitate during this part of history. Asked if she would like her mother to leave the room, but she declined.   - Contraception: Mirena  - History of STDS/UTIs: Denies and is not concerned for STI    Past medical history:   Denies    Past surgical history:   Past Surgical History:   Procedure Laterality Date     OPEN REDUCTION INTERNAL FIXATION WRIST Right 5/15/2018    Procedure: OPEN REDUCTION INTERNAL FIXATION WRIST;  Open Reduction Internal Fixation Right Distal Radius Fracture ;  Surgeon: Ezio Yin MD;  Location:  OR     Medications:  Current Facility-Administered Medications   Medication     acetaminophen (TYLENOL) tablet 650 mg     [START ON 1/27/2020] cefTRIAXone (ROCEPHIN) 2 g vial to attach to  ml bag for ADULTS or NS 50 ml bag for PEDS     ibuprofen (ADVIL/MOTRIN) tablet 200 mg     lidocaine (LMX4) cream     lidocaine (LMX4) cream     lidocaine 1 % 0.1-1 mL     lidocaine 1 % 0.1-1 mL     naloxone (NARCAN) injection 0.1-0.4 mg     ondansetron (ZOFRAN-ODT) ODT tab 4 mg    Or     ondansetron (ZOFRAN) injection 4 mg     oxyCODONE-acetaminophen (PERCOCET) 5-325 MG per tablet 1-2 tablet     pantoprazole (PROTONIX) 40 mg IV push injection     senna-docusate (SENOKOT-S/PERICOLACE) 8.6-50 MG per tablet 1-2 tablet     sodium chloride (PF) 0.9% PF flush 3 mL     sodium chloride (PF) 0.9% PF flush 3 mL     sodium chloride (PF) 0.9% PF flush 3 mL     sodium chloride (PF) 0.9% PF flush 3 mL     sodium chloride 0.9% infusion     Allergies:  No Known Allergies    Social Hx:   Social History     Tobacco Use     Smoking status: Never Smoker     Smokeless tobacco: Never Used   Substance Use Topics     Alcohol use: None  "    Drug use: None   Alcohol 2-3 times per week  No tobacco or recreational drugs    Family History:   No family history of breast, ovarian or uterine cancer. No history of DVT or migranes.    ROS:   Negative except per HPI    Objective:   /72   Pulse 122   Temp 98.9  F (37.2  C) (Oral)   Resp 16   Ht 1.702 m (5' 7\")   Wt 63.3 kg (139 lb 9.6 oz)   LMP 12/30/2019   SpO2 99%   BMI 21.86 kg/m    Constitutional: Healthy appearing female, no acute distress  HEENT: Normal appearance.   Cardiovascular: Tachycardic and rhythm without murmurs, clicks, gallops or rub  Respiratory: Clear to auscultation bilaterally without crackles or wheeze  Gastrointestinal: Abdomen soft, mild tenderness diffusely over abdomen. Reports referred pain to lower quadrants when palpating upper quadrants. No guarding or rebound. No flank pain or CVA tenderness.  Pelvic Exam - : Bimanual: No CMT, small uterus. No adnexal masses. Mild suprapubic and left adnexal tenderness.   Skin: No suspicious lesions or rashes  Psychiatric: Mentation appears normal and affect normal/bright    Labs/Imaging:  Results for orders placed or performed during the hospital encounter of 01/26/20   US Renal Complete     Status: None    Narrative    EXAMINATION: US RENAL COMPLETE  1/26/2020 2:31 PM      CLINICAL HISTORY: UTI with fever eval for obstructive changes.    COMPARISON: None.    FINDINGS:    Right kidney: Right renal length: 11 cm. The right kidney is normal in  position and the renal parenchyma is mildly heterogeneous. There is no  evident calculus or renal scarring. No hydronephrosis.    Left kidney: Left renal length: 12.7 cm. The left kidney is normal in  position and the renal parenchyma is mildly heterogeneous. There is no  evident calculus or renal scarring. No hydronephrosis.     The urinary bladder is incompletely distended and poorly visualized.  Small free fluid in the pelvis.            Impression    IMPRESSION:  1. No hydronephrosis. " The renal parenchyma is mildly heterogeneous  bilaterally, which can be seen in pyelonephritis.  2. Small volume of pelvic free fluid.    I have personally reviewed the examination and initial interpretation  and I agree with the findings.    IGLESIA KOEHLER MD   US Pelvis Cmplt w Transvag & Doppler LmtPel Duplex Limited     Status: Abnormal   Result Value Ref Range    Radiologist flags Possible ovarian torsion (Urgent)     Narrative    EXAMINATION: Body ultrasound pelvis , 1/26/2020 7:10 PM     COMPARISON: None.    HISTORY: UTI with IUD with pelvic pain, left greater than right,  evaluate for torsion or mass.    TECHNIQUE: The pelvis was scanned in standard fashion with  transabdominal and transvaginal transducer(s) using both grey scale  and color Doppler techniques.    FINDINGS  The uterus measures 7.5 x 2.9 x 4.7 cm, and there is no evidence of a  focal fibroid. The endometrium is within normal limits and measures 6  mm. Hyperechoic linear structure within the endometrium compatible  with appropriately positioned intrauterine device. There is no free  fluid in the pelvis.    The right ovary measures 5.1 x 1.7 x 2.6 cm with a volume of 11.7 mL  and the left ovary measures 3.0 x 4.5 x 4.8 cm, with a volume of 33.7  mL. There is no adnexal mass. There is normal arterial blood flow to  the ovaries. The left ovary is hypoechoic. There is a small amount of  complex fluid adjacent to the left ovary.        Impression    IMPRESSION:   1. Edematous, asymmetrically enlarged left ovary with a volume of 33.7  mm and adjacent complex fluid. While there is documented left ovarian  arterial blood flow, intermittent left ovarian torsion is not  excluded. Tubo-ovarian abscess is considered less likely given lack of  discrete walls and absence of peripheral hyperemia associated with the  donavan-ovarian complex fluid.  2. Appropriately positioned IUD.      [Urgent Result: Possible ovarian torsion]    Finding was identified on  1/26/2020 7:01 PM.     Dr. Ng was contacted by Dr. Hogue at 1/26/2020 7:12 PM and  verbalized understanding of the urgent finding.     I have personally reviewed the examination and initial interpretation  and I agree with the findings.    IGLESIA KOEHLER MD   CBC with platelets differential     Status: Abnormal   Result Value Ref Range    WBC 15.8 (H) 4.0 - 11.0 10e9/L    RBC Count 4.95 3.8 - 5.2 10e12/L    Hemoglobin 15.0 11.7 - 15.7 g/dL    Hematocrit 45.3 35.0 - 47.0 %    MCV 92 78 - 100 fl    MCH 30.3 26.5 - 33.0 pg    MCHC 33.1 31.5 - 36.5 g/dL    RDW 12.3 10.0 - 15.0 %    Platelet Count 282 150 - 450 10e9/L    Diff Method Manual Differential     % Neutrophils 89.6 %    % Lymphocytes 5.2 %    % Monocytes 4.3 %    % Eosinophils 0.0 %    % Basophils 0.9 %    Absolute Neutrophil 14.2 (H) 1.6 - 8.3 10e9/L    Absolute Lymphocytes 0.8 0.8 - 5.3 10e9/L    Absolute Monocytes 0.7 0.0 - 1.3 10e9/L    Absolute Eosinophils 0.0 0.0 - 0.7 10e9/L    Absolute Basophils 0.1 0.0 - 0.2 10e9/L    RBC Morphology Normal     Platelet Estimate Confirming automated cell count    Comprehensive metabolic panel     Status: Abnormal   Result Value Ref Range    Sodium 133 133 - 144 mmol/L    Potassium 3.6 3.4 - 5.3 mmol/L    Chloride 100 94 - 109 mmol/L    Carbon Dioxide 27 20 - 32 mmol/L    Anion Gap 6 3 - 14 mmol/L    Glucose 129 (H) 70 - 99 mg/dL    Urea Nitrogen 13 7 - 30 mg/dL    Creatinine 1.11 (H) 0.52 - 1.04 mg/dL    GFR Estimate 70 >60 mL/min/[1.73_m2]    GFR Estimate If Black 81 >60 mL/min/[1.73_m2]    Calcium 9.5 8.5 - 10.1 mg/dL    Bilirubin Total 1.6 (H) 0.2 - 1.3 mg/dL    Albumin 3.6 3.4 - 5.0 g/dL    Protein Total 8.2 6.8 - 8.8 g/dL    Alkaline Phosphatase 107 40 - 150 U/L    ALT 12 0 - 50 U/L    AST 6 0 - 45 U/L   HCG qualitative urine     Status: None   Result Value Ref Range    HCG Qual Urine Negative NEG^Negative   UA with Microscopic reflex to Culture     Status: Abnormal   Result Value Ref Range    Color Urine  Yellow     Appearance Urine Slightly Cloudy     Glucose Urine Negative NEG^Negative mg/dL    Bilirubin Urine Small (A) NEG^Negative    Ketones Urine Negative NEG^Negative mg/dL    Specific Gravity Urine 1.021 1.003 - 1.035    Blood Urine Large (A) NEG^Negative    pH Urine 5.5 5.0 - 7.0 pH    Protein Albumin Urine 30 (A) NEG^Negative mg/dL    Urobilinogen mg/dL 2.0 0.0 - 2.0 mg/dL    Nitrite Urine Negative NEG^Negative    Leukocyte Esterase Urine Small (A) NEG^Negative    Source Midstream Urine     WBC Urine 14 (H) 0 - 5 /HPF    RBC Urine 56 (H) 0 - 2 /HPF    Squamous Epithelial /HPF Urine 6 (H) 0 - 1 /HPF    Mucous Urine Present (A) NEG^Negative /LPF    Granular Casts 14 (A) NEG^Negative /LPF   Lactic acid whole blood     Status: None   Result Value Ref Range    Lactic Acid 1.5 0.7 - 2.0 mmol/L   EKG 12-lead, complete     Status: None (Preliminary result)   Result Value Ref Range    Interpretation ECG Click View Image link to view waveform and result    Blood culture     Status: None (Preliminary result)   Result Value Ref Range    Specimen Description Blood Left Arm     Special Requests Aerobic and anaerobic bottles received     Culture Micro No growth after 3 hours    Urine Culture Aerobic Bacterial     Status: None (Preliminary result)   Result Value Ref Range    Specimen Description Unspecified Urine     Culture Micro PENDING    Wet prep     Status: None   Result Value Ref Range    Specimen Description Vagina     Wet Prep No motile Trichomonas seen     Wet Prep No yeast seen     Wet Prep No clue cells seen     Wet Prep Rare  PMNs seen         Assessment:   Karen Aguayo is a 24 year old G0 admitted for concern for pyelonephritis. Reports 2 days of primarily left sided abdominal pain. Was seen at Urgent Care 1/25 and received a dose of Rocephin and discharged with course of Cipro. Presented to the ED with continued pain, nausea/vomiting, diarrhea, and syncopal episode. Patient febrile to 101.7 and tachycardic  to 120s. ED workup remarkable for: WBC 15.8, Cr 1.1, LA 1.5, UA with WBC/small LE, neg nitrites. Renal US demonstrated heterogenous parenchyma concerning for pyelonephritis without hydronephrosis. Pelvic ultrasound preliminary read concerning for edematous, enlarged left ovary with adjacent complex fluid but no discrete mass.     Differential diagnosis at this time includes: UTI/pyelonephritis, ovarian torsion, PID, TOA, appendicitis, gastroenteritis, etc. Her history of constant dull pain and mild tenderness diffusely on exam is not consistent with acute ovarian torsion. Normal doppler flow is noted on ultrasound; however, cannot rule out intermittent torsion given edematous/enlarged ovary. She certainly does not have an acute abdomen at this time necessitating surgical intervention. Patient is sexually active and is not forthcoming about sexual partners with mother in the room. However, she denies any concern for STI and denies history. There is not CMT on exam. Will await results of GC/CT but cannot rule out possibility of PID/TOA. TOA less likely given lack of discrete walls associated with periovarian complex fluid.     Plan:   - Agree with antibiotics, IVF for suspected pyelonephritis  - Agree with serial abdominal exams per primary team  - Will follow up GC/CT PCR results, could consider empiric PID treatment if not clinically improving  - Tylenol for fevers/pain, consider holding NSAIDs until TOM improves    Gynecology will continue to follow. If clinically worsening and/or patient develops an acute abdomen, do not hesitate to contact the gynecology team.    Discussed with Dr. Cottrell. Will be seen by Ob-Gyn staff tomorrow 1/27.    Pema Robbins MD  OB/GYN Resident, PGY-2  1/26/2020 7:35 PM     I agree with the plan of care as documented in the resident's note.  MD Pj

## 2020-01-27 NOTE — DISCHARGE SUMMARY
"[facility]  Gynecology Discharge Summary    Admission Date:  1/26/2020  Discharge Date:  1/29/20    Admission Attending: Rob Bautista MD  Discharge Attending: Alka De León MD    Admission Diagnosis:  - LLQ pain  - Concern for pyelonephritis  - Leukocytosis  - Fever  - Nausea, vomiting  - Diarrhea    Discharge Diagnosis:  - Same  - suspect left TOA  - ruled out for sepsis    Procedures Performed:  - IV antibiotics  - CT A/P    Admission History:  Ms. Karen Aguayo is a 24 year old who was initially seen at [facility] on 1/25 and given IM ceftriaxone and discharged with ciprofloxacin x5 days for UTI/pyelonephritis. While home, she started having nausea, vomiting and diarrhea. She presented again the [place] ED where she was noted to have leukocytosis, UA with small Le and neg nitrite. Renal US noted to be mildly heterogenous bilaterally which could be seen in pyelonephritis. She was admitted for observation of presumed pyelonephritis to medicine service. On HD#2, gynecology was consulted due to pelvic US and CT A/P finding concerning for left tubo-ovarian abscess (\"mildly rim-enhancing, heterogenous left paraovarian fluid collection measuring 2.8 x 2.6 cm\"). She was transitioned from ceftriaxone to IV cefoxitin, doxycycline and flagyl. She was transferred to gynecology service on the [facility] for further management.    Hospital Course:  She remained on IV antibiotics until 48 hours afebrile, at which point she was transitioned to PO doxycycline and flagyl for a total of 14 day course. On HD#4, she was tolerating regular diet, ambulating without difficulty, voiding spontaneously, and controlling pain with PO medications, and she was deemed stable for discharge. She was discharged with PO antibiotics to complete 14 day course.    Discharge Plans:  - The patient was discharged to home  - Activity as tolerated  - She was instructed to call OBGYN clinic or return to ED " if she has any of the following:    - Temperature greater than 100.4F   - Pain not controlled by pain medications   - Uncontrolled nausea/vomiting  - She will follow up in OBGYN clinic in 2-4 weeks    - Discharge medications include:     Review of your medicines      START taking      Dose / Directions   doxycycline hyclate 100 MG capsule  Commonly known as:  VIBRAMYCIN  Indication:  tubo ovarian abscess      Dose:  100 mg  Take 1 capsule (100 mg) by mouth every 12 hours  Quantity:  26 capsule  Refills:  0     metroNIDAZOLE 500 MG tablet  Commonly known as:  FLAGYL  Indication:  Abscess      Dose:  500 mg  Take 1 tablet (500 mg) by mouth 2 times daily  Quantity:  26 tablet  Refills:  0        CONTINUE these medicines which have NOT CHANGED      Dose / Directions   acetaminophen 325 MG tablet  Commonly known as:  TYLENOL      Dose:  325-650 mg  Take 325-650 mg by mouth every 6 hours as needed for mild pain  Refills:  0     ibuprofen 200 MG tablet  Commonly known as:  ADVIL/MOTRIN      Dose:  200 mg  Take 200 mg by mouth every 6 hours as needed for mild pain  Refills:  0        STOP taking    ciprofloxacin 500 MG tablet  Commonly known as:  CIPRO              Where to get your medicines      These medications were sent to [facility] [address]  [address] [address]    Phone:  [phone]     doxycycline hyclate 100 MG capsule    metroNIDAZOLE 500 MG tablet       Jessi Harrington MD  Obstetrics and Gynecology, PGY-1  01/29/20. 6:48 AM    [facility] staff:  Appreciate note by Dr. Harrington.  I have seen and examined the patient without the resident. I have reviewed, edited, and agree with the note.      Madeleine Chowdhury MD, FACOG  2/4/2020  1:13 PM

## 2020-01-27 NOTE — PLAN OF CARE
"Observation Goals:     -Diagnostic tests and consults completed and resulted if ordered: not met  - Vital signs normal or at patient baseline: /71 (BP Location: Right arm)   Pulse 122   Temp 98.5  F (36.9  C) (Oral)   Resp 16   Ht 1.702 m (5' 7\")   Wt 63.3 kg (139 lb 9.6 oz)   LMP 12/30/2019   SpO2 99%   BMI 21.86 kg/m     - Tolerating oral intake to maintain hydration: tolerating clear liquids; IV fluids running    - Adequate pain control on oral analgesia: met; pt is resting comfortably and not requesting pain medications at this time   - Tolerating oral antibiotics or has plans for home infusion setup: not met; IV antibiotics ordered   - Infection is improving: in progress  - Safe disposition plan has been identified: not met   "

## 2020-01-27 NOTE — PROGRESS NOTES
OBGYN Progress Note    S: Patient feeling somewhat better this am. No fevers overnight, is having some diarrhea. Pain continues but is stable/improved. Hungry.     O:   Vitals:    01/27/20 0254 01/27/20 0823 01/27/20 0857   BP: 119/71 115/63    Pulse:      Resp: 16 16    Temp: 98.5  F (36.9  C) 99.7  F (37.6  C) 98.9  F (37.2  C)   SpO2: 99% 97%      General: No distress  Abdomen: Soft, non-distended, mildly tender to palpation, no guarding, rebound present    CBC RESULTS:   Recent Labs   Lab Test 01/27/20  0624   WBC 14.3*   RBC 4.02   HGB 12.3   HCT 37.7   MCV 94   MCH 30.6   MCHC 32.6   RDW 12.4      Procalcitonin: 28.63  Lactate 0.5  GC negative  Chlam negative  Bldx NGTD  UCx NGTD  Cdiff negative    CT: Impression:   1. Mildly rim enhancing, heterogeneous left paraovarian fluid  collection consistent with a tubo-ovarian abscess 2.8 x 2.6 cm. Extensive  associated pelvic inflammation.  2. Dilation of distal ileum is likely reactive to pelvic inflammation.    A/P: 24 year old G0 admitted with fevers, abdominal pain, initially thought to be pyelonephritis, but now consistent with TOA    TOA: S/p rocephin x 1 day, transition to cefoxitin/doxy for TOA treatment. Reviewed inpatient treatment until clinically improved and afebrile. Due to small TOA size and overall clinical stability, no indication at this point for IR drainage or surgery. If clinically not improving would remove IUD and consider IR drainage.    Discussed TOA diagnosis, treatment plans and possible implications on future fertility with patient and her mom. All questions answered.     Transfer to [facility] gynecology team    Suze Ma MD

## 2020-01-27 NOTE — PLAN OF CARE
"/63 (BP Location: Left arm)   Pulse 122   Temp 98.9  F (37.2  C)   Resp 16   Ht 1.702 m (5' 7\")   Wt 63.3 kg (139 lb 9.6 oz)   LMP 12/30/2019   SpO2 97%   BMI 21.86 kg/m            Neuro: A/Ox4. WNL    Cardiac:  Pt is tachycardic running from 's.            Respiratory: WNL   GI/: Pt has bilateral lower abdominal pain. Pt up to the bathroom frequently.   Diet/appetite: Pt is on a regular diet with minimal appetite.    Activity:  Up IND.  Pain: Pt is given tylenol for pain x 1. Pt reports decrease in pain.   Skin: pt had dry, flaky skin. Pt skin is intact.     Plan: Pt to be transferred to [facility] unit 10A.    "

## 2020-01-27 NOTE — PROGRESS NOTES
"Patient ID:  Karen Aguayo  MRN: [id number]  24 year old  YOB: 1995    Observation Admit Date: 1/26/2020    ED Admitting Attending: Lidia Lowery MD    Transfer Date and Time: January 27, 2020 at 5:07 PM     Transferring Observation Provider: Maribell Carrillo PA-C    Admission Diagnoses:     1. Pyelonephritis, acute    2. Left ovarian enlargement    3. Suprapubic pain    4. Fever and chills        Transfer Diagnoses:    1. Pyelonephritis   2. Left ovarian enlargement  3. Fever     Emergency Department and Observation Course:   Karen Aguayo is a 24 year old otherwise healthy female who presented to the ED for suprapubic abdominal pain, nausea, vomiting, diarrhea, and syncopal episode.   Per care everywhere, she was seen at [facility] on 1/25 and was diagnosed with UTI/Pyelpnephritis.         ##UTI/Pyelonehphritis:   ##SIRS/Sepsis (Leukocytosis, Fever, Tachycardia):    She was seen at urgent care yesterday and was told she has a UTI and pyelonephritis. She was given Rocephin and discharge on Cipro. Now presenting to the ED with suprapubic abdominal pain, nausea, vomiting, diarrhea, and syncopal episode. In the ED, WBC was 15.8, creatinine was 1.11, lactic acid was 1.5, UA showed 14 WBC's, small LE, negative nitrites. UA consistent with infection here, but slightly improved from UA on 1/25. Renal US showed, \" 1. No hydronephrosis. The renal parenchyma is mildly heterogeneous bilaterally, which can be seen in pyelonephritis. 2. Small volume of pelvic free fluid.\" She also have US pelvis showing edematous, asymmetrically enlarged left ovary with a volume of 33.7 mm and adjacent complex fluid. GYN was consulted. Recommended to continue with antibiotics and serial abdominal exams. She is admitted to observation for monitoring and treatment of Pyelonephritis. Visiting with the patient this morning, she continues to c/o continued lower abdominal pain bilateral. WBC is 14, 000. She is " "afebrile but pt feels warm. Additionally, she is having multiple episodes of watery non bloody diarrhea that is not slowing down. She was tachy to 130. Her procalcitonin came back elevated at 28.6. Subsequently pt triggered the sepsis protocol at which point lactic acid was checked however this was normal. Given pt being hemodynamically unstable and infectious markers being positive, discussed with internal medicine who accepted the patient. At this time the patient has failed observation management due to continued leukocytosis, tachycardia, and elevated procalcitonin and will be transferred to inpatient status.       Consults: GYN    DATA:    Transfer Exam:    /65 (BP Location: Left arm)   Pulse 122   Temp 99.9  F (37.7  C) (Oral)   Resp 16   Ht 1.702 m (5' 7\")   Wt 63.3 kg (139 lb 9.6 oz)   LMP 12/30/2019   SpO2 97%   BMI 21.86 kg/m    Physical Exam   Constitutional: Pt is oriented to person, place, and time.Pt appears well-developed and well-nourished.   HENT:   Head: Normocephalic and atraumatic.   Eyes: Conjunctivae are normal. Pupils are equal, round, and reactive to light.   Neck: Normal range of motion. Neck supple.   Cardiovascular: Normal rate, regular rhythm, normal heart sounds and intact distal pulses.    Pulmonary/Chest: Effort normal and breath sounds normal. No respiratory distress. Pt has no wheezes. Pt has no rales  Abdominal: Diffuse lower abdominal tenderness. Soft. Bowel sounds are normal. Pt exhibits no distension and no mass. No tenderness. Pt has no rebound and no guarding.          Current Medications:    No current outpatient medications on file.       Medications Prior to Admission:    Medications Prior to Admission   Medication Sig Dispense Refill Last Dose     acetaminophen (TYLENOL) 325 MG tablet Take 325-650 mg by mouth every 6 hours as needed for mild pain   1/26/2020 at AM     ciprofloxacin (CIPRO) 500 MG tablet Take 500 mg by mouth 2 times daily   1/25/2020 at PM     " ibuprofen (ADVIL/MOTRIN) 200 MG tablet Take 200 mg by mouth every 6 hours as needed for mild pain   1/25/2020 at PM        Significant Diagnostic Studies:     Results for orders placed or performed during the hospital encounter of 01/26/20   US Renal Complete     Status: None    Narrative    EXAMINATION: US RENAL COMPLETE  1/26/2020 2:31 PM      CLINICAL HISTORY: UTI with fever eval for obstructive changes.    COMPARISON: None.    FINDINGS:    Right kidney: Right renal length: 11 cm. The right kidney is normal in  position and the renal parenchyma is mildly heterogeneous. There is no  evident calculus or renal scarring. No hydronephrosis.    Left kidney: Left renal length: 12.7 cm. The left kidney is normal in  position and the renal parenchyma is mildly heterogeneous. There is no  evident calculus or renal scarring. No hydronephrosis.     The urinary bladder is incompletely distended and poorly visualized.  Small free fluid in the pelvis.            Impression    IMPRESSION:  1. No hydronephrosis. The renal parenchyma is mildly heterogeneous  bilaterally, which can be seen in pyelonephritis.  2. Small volume of pelvic free fluid.    I have personally reviewed the examination and initial interpretation  and I agree with the findings.    IGLESIA KOEHLER MD   US Pelvis Cmplt w Transvag & Doppler LmtPel Duplex Limited     Status: Abnormal   Result Value Ref Range    Radiologist flags Possible ovarian torsion (Urgent)     Narrative    EXAMINATION: Body ultrasound pelvis , 1/26/2020 7:10 PM     COMPARISON: None.    HISTORY: UTI with IUD with pelvic pain, left greater than right,  evaluate for torsion or mass.    TECHNIQUE: The pelvis was scanned in standard fashion with  transabdominal and transvaginal transducer(s) using both grey scale  and color Doppler techniques.    FINDINGS  The uterus measures 7.5 x 2.9 x 4.7 cm, and there is no evidence of a  focal fibroid. The endometrium is within normal limits and measures  6  mm. Hyperechoic linear structure within the endometrium compatible  with appropriately positioned intrauterine device. There is no free  fluid in the pelvis.    The right ovary measures 5.1 x 1.7 x 2.6 cm with a volume of 11.7 mL  and the left ovary measures 3.0 x 4.5 x 4.8 cm, with a volume of 33.7  mL. There is no adnexal mass. There is normal arterial blood flow to  the ovaries. The left ovary is hypoechoic. There is a small amount of  complex fluid adjacent to the left ovary.        Impression    IMPRESSION:   1. Edematous, asymmetrically enlarged left ovary with a volume of 33.7  mm and adjacent complex fluid. While there is documented left ovarian  arterial blood flow, intermittent left ovarian torsion is not  excluded. Tubo-ovarian abscess is considered less likely given lack of  discrete walls and absence of peripheral hyperemia associated with the  donavan-ovarian complex fluid.  2. Appropriately positioned IUD.      [Urgent Result: Possible ovarian torsion]    Finding was identified on 1/26/2020 7:01 PM.     Dr. Ng was contacted by Dr. Hogue at 1/26/2020 7:12 PM and  verbalized understanding of the urgent finding.     I have personally reviewed the examination and initial interpretation  and I agree with the findings.    IGLESIA KOEHLER MD   CT Abdomen Pelvis w Contrast     Status: Abnormal   Result Value Ref Range    Radiologist flags Left tubo-ovarian abscess (Urgent)     Narrative    CT of the Abdomen and Pelvis with contrast, 1/27/2020 10:45 AM.    Comparison: Ultrasound 1/26/2020.    History: acute lower right abdominal pain. Ovarian torsion suspected  on US evaluated by Gyn, please evaluate for other sources of  pain/infection..     Technique: Axial images of the  abdomen and pelvis were obtained with  contrast. Coronal reconstructions were provided. Images were reviewed  in bone, lung, and soft tissue windows.     Total DLP: 518 mGy*cm.     iopamidol (ISOVUE-370) solution 85  mL    Findings:  Exam partially limited by motion artifact.    Chest: The visualized esophagus appears unremarkable. Poorly assessed  3 mm pulmonary nodule left lower lobe (series 2, image 4). No evidence  of lung infection. Heart size within normal limits.. No pleural  effusion.    Abdomen and Pelvis: There are no suspicious hepatic lesions. No  intrahepatic or extrahepatic biliary dilatation. No opaque gallbladder  calculi. Pancreas unremarkable. Spleen size within normal limits. No  suspicious adrenal mass lesions. Symmetric nephrographic renal phase.  No evidence of hydronephrosis. Visualized ureters and urinary bladder  is unremarkable. No diverticulitis. No evidence of bowel obstruction.  The left ovary measures approximately 2.0 cm. There is a mildly  rim-enhancing, heterogeneous left paraovarian fluid collection  measuring 2.8 x 2.6 cm (series 2, image 74. Extensive inflammatory  changes throughout the pelvis with peritoneal enhancement enhancement  fluid and fat stranding. The appendix is nondilated with mild wall  enhancement, which may be reactive. Numerous loops of ileum are mildly  enlarged, likely reactive. Abdominal vasculature unremarkable. No  suspicious or enlarged mesenteric, retroperitoneal and pelvic lymph  nodes. IUD appears appropriately positioned within the uterine cavity.    Bones and Soft Tissues: No suspicious osseous lesion. No suspicious  mass.         Impression    Impression:   1. Mildly rim enhancing, heterogeneous left paraovarian fluid  collection consistent with a tubo-ovarian abscess. Extensive  associated pelvic inflammation.  2. Dilation of distal ileum is likely reactive to pelvic inflammation.    [Urgent Result: Left tubo-ovarian abscess]    Finding was identified on 1/27/2020 11:13 AM.     Dr. Rachel was contacted by Dr. Mccabe at 1/27/2020 11:35 AM and  verbalized understanding of the urgent finding.     I have personally reviewed the examination and initial  interpretation  and I agree with the findings.    SCOTT MEZA MD   CBC with platelets differential     Status: Abnormal   Result Value Ref Range    WBC 15.8 (H) 4.0 - 11.0 10e9/L    RBC Count 4.95 3.8 - 5.2 10e12/L    Hemoglobin 15.0 11.7 - 15.7 g/dL    Hematocrit 45.3 35.0 - 47.0 %    MCV 92 78 - 100 fl    MCH 30.3 26.5 - 33.0 pg    MCHC 33.1 31.5 - 36.5 g/dL    RDW 12.3 10.0 - 15.0 %    Platelet Count 282 150 - 450 10e9/L    Diff Method Manual Differential     % Neutrophils 89.6 %    % Lymphocytes 5.2 %    % Monocytes 4.3 %    % Eosinophils 0.0 %    % Basophils 0.9 %    Absolute Neutrophil 14.2 (H) 1.6 - 8.3 10e9/L    Absolute Lymphocytes 0.8 0.8 - 5.3 10e9/L    Absolute Monocytes 0.7 0.0 - 1.3 10e9/L    Absolute Eosinophils 0.0 0.0 - 0.7 10e9/L    Absolute Basophils 0.1 0.0 - 0.2 10e9/L    RBC Morphology Normal     Platelet Estimate Confirming automated cell count    Comprehensive metabolic panel     Status: Abnormal   Result Value Ref Range    Sodium 133 133 - 144 mmol/L    Potassium 3.6 3.4 - 5.3 mmol/L    Chloride 100 94 - 109 mmol/L    Carbon Dioxide 27 20 - 32 mmol/L    Anion Gap 6 3 - 14 mmol/L    Glucose 129 (H) 70 - 99 mg/dL    Urea Nitrogen 13 7 - 30 mg/dL    Creatinine 1.11 (H) 0.52 - 1.04 mg/dL    GFR Estimate 70 >60 mL/min/[1.73_m2]    GFR Estimate If Black 81 >60 mL/min/[1.73_m2]    Calcium 9.5 8.5 - 10.1 mg/dL    Bilirubin Total 1.6 (H) 0.2 - 1.3 mg/dL    Albumin 3.6 3.4 - 5.0 g/dL    Protein Total 8.2 6.8 - 8.8 g/dL    Alkaline Phosphatase 107 40 - 150 U/L    ALT 12 0 - 50 U/L    AST 6 0 - 45 U/L   HCG qualitative urine     Status: None   Result Value Ref Range    HCG Qual Urine Negative NEG^Negative   UA with Microscopic reflex to Culture     Status: Abnormal   Result Value Ref Range    Color Urine Yellow     Appearance Urine Slightly Cloudy     Glucose Urine Negative NEG^Negative mg/dL    Bilirubin Urine Small (A) NEG^Negative    Ketones Urine Negative NEG^Negative mg/dL    Specific  Gravity Urine 1.021 1.003 - 1.035    Blood Urine Large (A) NEG^Negative    pH Urine 5.5 5.0 - 7.0 pH    Protein Albumin Urine 30 (A) NEG^Negative mg/dL    Urobilinogen mg/dL 2.0 0.0 - 2.0 mg/dL    Nitrite Urine Negative NEG^Negative    Leukocyte Esterase Urine Small (A) NEG^Negative    Source Midstream Urine     WBC Urine 14 (H) 0 - 5 /HPF    RBC Urine 56 (H) 0 - 2 /HPF    Squamous Epithelial /HPF Urine 6 (H) 0 - 1 /HPF    Mucous Urine Present (A) NEG^Negative /LPF    Granular Casts 14 (A) NEG^Negative /LPF   Lactic acid whole blood     Status: None   Result Value Ref Range    Lactic Acid 1.5 0.7 - 2.0 mmol/L   CBC with platelets differential     Status: Abnormal   Result Value Ref Range    WBC 19.8 (H) 4.0 - 11.0 10e9/L    RBC Count 4.38 3.8 - 5.2 10e12/L    Hemoglobin 13.3 11.7 - 15.7 g/dL    Hematocrit 41.0 35.0 - 47.0 %    MCV 94 78 - 100 fl    MCH 30.4 26.5 - 33.0 pg    MCHC 32.4 31.5 - 36.5 g/dL    RDW 12.3 10.0 - 15.0 %    Platelet Count 285 150 - 450 10e9/L    Diff Method Automated Method     % Neutrophils 49.7 %    % Lymphocytes 4.2 %    % Monocytes 5.3 %    % Eosinophils 37.4 %    % Basophils 0.8 %    % Immature Granulocytes 2.6 %    Nucleated RBCs 0 0 /100    Absolute Neutrophil 9.9 (H) 1.6 - 8.3 10e9/L    Absolute Lymphocytes 0.8 0.8 - 5.3 10e9/L    Absolute Monocytes 1.1 0.0 - 1.3 10e9/L    Absolute Eosinophils 7.4 (H) 0.0 - 0.7 10e9/L    Absolute Basophils 0.2 0.0 - 0.2 10e9/L    Abs Immature Granulocytes 0.5 (H) 0 - 0.4 10e9/L    Absolute Nucleated RBC 0.0    CBC with platelets differential     Status: Abnormal   Result Value Ref Range    WBC 14.3 (H) 4.0 - 11.0 10e9/L    RBC Count 4.02 3.8 - 5.2 10e12/L    Hemoglobin 12.3 11.7 - 15.7 g/dL    Hematocrit 37.7 35.0 - 47.0 %    MCV 94 78 - 100 fl    MCH 30.6 26.5 - 33.0 pg    MCHC 32.6 31.5 - 36.5 g/dL    RDW 12.4 10.0 - 15.0 %    Platelet Count 259 150 - 450 10e9/L    Diff Method Automated Method     % Neutrophils 86.5 %    % Lymphocytes 5.5 %    %  Monocytes 6.8 %    % Eosinophils 0.1 %    % Basophils 0.6 %    % Immature Granulocytes 0.5 %    Nucleated RBCs 0 0 /100    Absolute Neutrophil 12.4 (H) 1.6 - 8.3 10e9/L    Absolute Lymphocytes 0.8 0.8 - 5.3 10e9/L    Absolute Monocytes 1.0 0.0 - 1.3 10e9/L    Absolute Eosinophils 0.0 0.0 - 0.7 10e9/L    Absolute Basophils 0.1 0.0 - 0.2 10e9/L    Abs Immature Granulocytes 0.1 0 - 0.4 10e9/L    Absolute Nucleated RBC 0.0    Basic metabolic panel     Status: Abnormal   Result Value Ref Range    Sodium 134 133 - 144 mmol/L    Potassium 3.5 3.4 - 5.3 mmol/L    Chloride 106 94 - 109 mmol/L    Carbon Dioxide 21 20 - 32 mmol/L    Anion Gap 7 3 - 14 mmol/L    Glucose 106 (H) 70 - 99 mg/dL    Urea Nitrogen 10 7 - 30 mg/dL    Creatinine 0.79 0.52 - 1.04 mg/dL    GFR Estimate >90 >60 mL/min/[1.73_m2]    GFR Estimate If Black >90 >60 mL/min/[1.73_m2]    Calcium 9.0 8.5 - 10.1 mg/dL   Procalcitonin     Status: Abnormal   Result Value Ref Range    Procalcitonin 28.63 (HH) ng/ml   Lactic acid level STAT     Status: Abnormal   Result Value Ref Range    Lactate for Sepsis Protocol 0.5 (L) 0.7 - 2.0 mmol/L   EKG 12-lead, complete     Status: None   Result Value Ref Range    Interpretation ECG Click View Image link to view waveform and result    Blood culture     Status: None (Preliminary result)   Result Value Ref Range    Specimen Description Blood Left Arm     Special Requests Aerobic and anaerobic bottles received     Culture Micro No growth after 1 day    Urine Culture Aerobic Bacterial     Status: None (Preliminary result)   Result Value Ref Range    Specimen Description Unspecified Urine     Culture Micro Culture negative < 24 hours, reincubate    Clostridium difficile toxin B PCR     Status: None   Result Value Ref Range    Specimen Description Feces     C Diff Toxin B PCR Negative NEG^Negative   Enteric Bacteria and Virus Panel by WAYNE Stool     Status: None   Result Value Ref Range    Campylobacter group by WAYNE Not Detected  NDET^Not Detected    Salmonella species by WAYNE Not Detected NDET^Not Detected    Shigella species by WAYNE Not Detected NDET^Not Detected    Vibrio group by WAYNE Not Detected NDET^Not Detected    Rotavirus A by WAYNE Not Detected NDET^Not Detected    Shiga toxin 1 gene by WAYNE Not Detected NDET^Not Detected    Shiga toxin 2 gene by WAYNE Not Detected NDET^Not Detected    Norovirus I and II by WAYNE Not Detected NDET^Not Detected    Yersinia enterocolitica by WAYNE Not Detected NDET^Not Detected    Enteric pathogen comment       Testing performed by multiplexed, qualitative PCR using the Dynamic [employer] Enteric   Pathogens Nucleic Acid Test. Results should not be used as the sole basis for diagnosis,   treatment, or other patient management decisions.     Chlamydia trachomatis PCR     Status: None   Result Value Ref Range    Specimen Description Endocervical     Chlamydia Trachomatis PCR Negative NEG^Negative   Neisseria gonorrhoeae PCR     Status: None   Result Value Ref Range    Specimen Descrip Endocervical     N Gonorrhea PCR Negative NEG^Negative   Wet prep     Status: None   Result Value Ref Range    Specimen Description Vagina     Wet Prep No motile Trichomonas seen     Wet Prep No yeast seen     Wet Prep No clue cells seen     Wet Prep Rare  PMNs seen          Signed:  Maribell Carrillo PA-C  January 27, 2020 at 5:07 PM

## 2020-01-28 LAB
ERYTHROCYTE [DISTWIDTH] IN BLOOD BY AUTOMATED COUNT: 13 % (ref 10–15)
FLUAV+FLUBV AG SPEC QL: NEGATIVE
FLUAV+FLUBV AG SPEC QL: NEGATIVE
HCT VFR BLD AUTO: 35.2 % (ref 35–47)
HGB BLD-MCNC: 11.6 G/DL (ref 11.7–15.7)
MCH RBC QN AUTO: 30.9 PG (ref 26.5–33)
MCHC RBC AUTO-ENTMCNC: 33 G/DL (ref 31.5–36.5)
MCV RBC AUTO: 94 FL (ref 78–100)
PLATELET # BLD AUTO: 266 10E9/L (ref 150–450)
RBC # BLD AUTO: 3.76 10E12/L (ref 3.8–5.2)
SPECIMEN SOURCE: NORMAL
WBC # BLD AUTO: 12.3 10E9/L (ref 4–11)

## 2020-01-28 PROCEDURE — 87804 INFLUENZA ASSAY W/OPTIC: CPT | Performed by: STUDENT IN AN ORGANIZED HEALTH CARE EDUCATION/TRAINING PROGRAM

## 2020-01-28 PROCEDURE — 25000128 H RX IP 250 OP 636: Performed by: STUDENT IN AN ORGANIZED HEALTH CARE EDUCATION/TRAINING PROGRAM

## 2020-01-28 PROCEDURE — 25000132 ZZH RX MED GY IP 250 OP 250 PS 637: Performed by: STUDENT IN AN ORGANIZED HEALTH CARE EDUCATION/TRAINING PROGRAM

## 2020-01-28 PROCEDURE — 12000001 ZZH R&B MED SURG/OB UMMC

## 2020-01-28 PROCEDURE — 85027 COMPLETE CBC AUTOMATED: CPT | Performed by: STUDENT IN AN ORGANIZED HEALTH CARE EDUCATION/TRAINING PROGRAM

## 2020-01-28 PROCEDURE — 25800030 ZZH RX IP 258 OP 636: Performed by: STUDENT IN AN ORGANIZED HEALTH CARE EDUCATION/TRAINING PROGRAM

## 2020-01-28 PROCEDURE — 36415 COLL VENOUS BLD VENIPUNCTURE: CPT | Performed by: STUDENT IN AN ORGANIZED HEALTH CARE EDUCATION/TRAINING PROGRAM

## 2020-01-28 RX ORDER — METRONIDAZOLE 500 MG/1
500 TABLET ORAL 3 TIMES DAILY
Status: DISCONTINUED | OUTPATIENT
Start: 2020-01-28 | End: 2020-01-29

## 2020-01-28 RX ADMIN — SODIUM CHLORIDE 1000 ML: 900 INJECTION INTRAVENOUS at 19:54

## 2020-01-28 RX ADMIN — DOXYCYCLINE 100 MG: 100 CAPSULE ORAL at 08:02

## 2020-01-28 RX ADMIN — METRONIDAZOLE 500 MG: 500 TABLET ORAL at 16:02

## 2020-01-28 RX ADMIN — ACETAMINOPHEN 650 MG: 325 TABLET, FILM COATED ORAL at 01:35

## 2020-01-28 RX ADMIN — CEFOXITIN SODIUM 2 G: 2 POWDER, FOR SOLUTION INTRAVENOUS at 08:18

## 2020-01-28 RX ADMIN — DOXYCYCLINE 100 MG: 100 CAPSULE ORAL at 19:54

## 2020-01-28 RX ADMIN — METRONIDAZOLE 500 MG: 500 TABLET ORAL at 19:54

## 2020-01-28 RX ADMIN — CEFOXITIN SODIUM 2 G: 2 POWDER, FOR SOLUTION INTRAVENOUS at 01:27

## 2020-01-28 RX ADMIN — METRONIDAZOLE 500 MG: 500 INJECTION, SOLUTION INTRAVENOUS at 04:09

## 2020-01-28 RX ADMIN — METRONIDAZOLE 500 MG: 500 INJECTION, SOLUTION INTRAVENOUS at 12:34

## 2020-01-28 RX ADMIN — ACETAMINOPHEN 650 MG: 325 TABLET, FILM COATED ORAL at 22:30

## 2020-01-28 RX ADMIN — ACETAMINOPHEN 650 MG: 325 TABLET, FILM COATED ORAL at 08:06

## 2020-01-28 RX ADMIN — ACETAMINOPHEN 650 MG: 325 TABLET, FILM COATED ORAL at 16:02

## 2020-01-28 ASSESSMENT — ACTIVITIES OF DAILY LIVING (ADL)
ADLS_ACUITY_SCORE: 10

## 2020-01-28 NOTE — PLAN OF CARE
Pt states pain tolerable  at present time. Tenderness noted in upper abdomen. Abd soft. BS hypo. Pt denies flatus. Up to bathroom to void independently. Friend at bedside. Appetite fair. Denies nausea at beginning of shift but does state she has intermittent nausea. Refused medication and states she will let staff know if she wants medication.Many family members in to see patient.  Call light within reach.

## 2020-01-28 NOTE — PROGRESS NOTES
Brief Gyn Progress Note    Went to see patient as she had just transferred to the [facility].  Pt reports she is doing okay.  Abdominal pain still present low across her belly but improved compared to yesterday.  Reports pain is worse when sitting up or moving around.  Reports poor appetite but eating small amounts without nausea or vomiting. Just fills up quickly.  Reports frequent episodes of loose stool every 30 minutes with frequent episodes of leaking stool in her underwear overnight.      Vitals:    01/27/20 0254 01/27/20 0823 01/27/20 0857 01/27/20 1607   BP: 119/71 115/63  107/65   BP Location: Right arm Left arm  Left arm   Pulse:       Resp: 16 16  16   Temp: 98.5  F (36.9  C) 99.7  F (37.6  C) 98.9  F (37.2  C) 99.9  F (37.7  C)   TempSrc: Oral Oral  Oral   SpO2: 99% 97%  97%   Weight:       Height:         General - well appearing, in NAD  Cardio - regular rate  Pulm - unlabored breatihng  Abd - soft, non-distendend, mild tenderness to palpation over lower quadrants, no guarding or rebound tenderness  Ext - soft, non-tender, no edema    A/P: Edel is a 25 yo HD#2 but has transferred to [facility] this evening and now is gynecology primary.  She is being treated for presumed TOA and on IV antibiotics.    - Gynecology to see her in the am  - S/p negative c diff and enteritis panel. Discussed that diarrhea may be 2/2 antibiotics. Will start imodium prn.  Hold stool softeners at this time  - Encouraged ambulation, SCDs overnight  - given fevers, diarrhea, continued nausea ordering rapid flu swab    Amy Schumer, MD  Obstetrics and Gynecology, PGY-3  01/27/20 7:50 PM

## 2020-01-28 NOTE — PLAN OF CARE
"Vitals: /62 (BP Location: Right arm)   Pulse 73   Temp 98.2  F (36.8  C) (Oral)   Resp 16   Ht 1.702 m (5' 7\")   Wt 63.3 kg (139 lb 9.6 oz)   LMP 12/30/2019   SpO2 99%   BMI 21.86 kg/m    Lung sounds clear. Denies CP, SOB.   Output: Voiding spontaneously without difficulty  Last BM: 1/28  Has been having diarrhea, no stools on my shift   Activity: Up independently but needs assistance plugging/unplugging IV pump   Skin: Intact.   Pain: Tylenol given x1 for abdominal/shoulder pain   CMS/Neuro: Intact. A&O x 4   Dressing: N/A   Diet: Regular diet, poor appetite. Denies nausea tonight but reports she has had nausea when eating throughout the day   LDA: L PIV infusing   Equipment: Iv pump/pole   Plan/Add'l info: Able to make needs known. Call light within reach.  Influenza A/B antigen sent, came back negative  Cont POC       "

## 2020-01-28 NOTE — PROGRESS NOTES
[facility]  Gynecology Progress Note      S:  Patient feels slightly better this morning. Reports occasional sweating overnight. Feels occasional pain with very deep breaths. Abdominal pain improved.  Reports absence of appetite but has not had nausea or emesis since admission. Has tolerated liquids.   Diarrhea improved significantly since starting imodium. Denies dysuria or increased urinary frequency.    O:  Patient Vitals for the past 24 hrs:   BP Temp Temp src Pulse Heart Rate Resp SpO2   01/28/20 0413 110/62 98.2  F (36.8  C) Oral 73 81 16 99 %   01/27/20 2248 110/42 97.8  F (36.6  C) Oral -- 96 16 98 %   01/27/20 1830 130/72 98.8  F (37.1  C) Oral -- 106 18 100 %   01/27/20 1607 107/65 99.9  F (37.7  C) Oral -- 100 16 97 %   01/27/20 0857 -- 98.9  F (37.2  C) -- -- -- -- --     Gen: Resting comfortably, NAD  CV: RRR, no murmurs  Pulm: CTAB, no wheezes  Abd: Soft, non tender, non-distended. No ttp in RUQ  Ext: no LE edema b/l    WBC 19.8: 14.3: 12.3    A/P:  Ms. Karen Aguayo is a 24 year old G0 HD 3 admitted with fevers and abdominal pain. Initially thought to be pyelonephritis. CT consistent with TOA. Clinically improved this am. Abdomen non tender. Leukocytosis improving.    TOA:  - D#2 cefoxitin 2g q6h, doxycline 100 mg q12h, metronidazole q8h. Transition to PO regimen this afternoon (sp 24 hr IV abx)  - S/p rocephin x1 day, 2x ceftriaxone and 1 day ciprofloxacin  - Gc/chlam/wet prep negative  - Afebrile for >24 hours (Tmax and last 101.7 on 1-26)  - WBC down trending. Follow up am CBC.   - Tylenol prn for fevers, pain control    Pain with deep breaths. Likely aspect of atelectasis vs peritonitis from TOA  - PE highly unlikely. No SOB. VSS. O2 sat 100%.  - discussed increasing her movement, ambulating 4-5 times per day to increase airflow to lower lung parenchyma  - incentive spirometer prn    Diarrhea:  - infectious workup negative  - hold stool softeners  - imodium prn  -  influenza negative    Dispo: pending transition to PO abx with continued clinical improvement.    Jessi Harrington MD  OB/GYN, PGY-1  1/28/2020, 7:24 AM    Appreciate Dr. Harrington's note above, patient also seen and examined by me, separately from the resident.  Patient tolerating regular diet and oral antibiotic therapy.  Exam is benign as above. I agree with the note above.   Rosa Naranjo MD

## 2020-01-28 NOTE — PLAN OF CARE
Pt arrived on 10A via paramedics and gurney. Able to assist with pivot transfer to the bed. Ambulates well to the bathroom. Pt having frequent liquid greenish stools. Provided pt with briefs. VSS, afebrile at this time. IV abx and doxycycline given as ordered. Mother is present with patient. Tylenol given for lower abdominal discomfort.

## 2020-01-29 VITALS
TEMPERATURE: 97.1 F | WEIGHT: 139.6 LBS | DIASTOLIC BLOOD PRESSURE: 63 MMHG | HEART RATE: 81 BPM | BODY MASS INDEX: 21.91 KG/M2 | RESPIRATION RATE: 18 BRPM | OXYGEN SATURATION: 98 % | HEIGHT: 67 IN | SYSTOLIC BLOOD PRESSURE: 112 MMHG

## 2020-01-29 LAB
ERYTHROCYTE [DISTWIDTH] IN BLOOD BY AUTOMATED COUNT: 13 % (ref 10–15)
HCT VFR BLD AUTO: 33 % (ref 35–47)
HGB BLD-MCNC: 11 G/DL (ref 11.7–15.7)
MCH RBC QN AUTO: 30.7 PG (ref 26.5–33)
MCHC RBC AUTO-ENTMCNC: 33.3 G/DL (ref 31.5–36.5)
MCV RBC AUTO: 92 FL (ref 78–100)
PLATELET # BLD AUTO: 282 10E9/L (ref 150–450)
RBC # BLD AUTO: 3.58 10E12/L (ref 3.8–5.2)
WBC # BLD AUTO: 9.7 10E9/L (ref 4–11)

## 2020-01-29 PROCEDURE — 25800030 ZZH RX IP 258 OP 636: Performed by: STUDENT IN AN ORGANIZED HEALTH CARE EDUCATION/TRAINING PROGRAM

## 2020-01-29 PROCEDURE — 36415 COLL VENOUS BLD VENIPUNCTURE: CPT | Performed by: OBSTETRICS & GYNECOLOGY

## 2020-01-29 PROCEDURE — 25000128 H RX IP 250 OP 636: Performed by: STUDENT IN AN ORGANIZED HEALTH CARE EDUCATION/TRAINING PROGRAM

## 2020-01-29 PROCEDURE — 85027 COMPLETE CBC AUTOMATED: CPT | Performed by: OBSTETRICS & GYNECOLOGY

## 2020-01-29 PROCEDURE — 25000132 ZZH RX MED GY IP 250 OP 250 PS 637: Performed by: STUDENT IN AN ORGANIZED HEALTH CARE EDUCATION/TRAINING PROGRAM

## 2020-01-29 RX ORDER — METRONIDAZOLE 500 MG/1
500 TABLET ORAL 2 TIMES DAILY
Status: DISCONTINUED | OUTPATIENT
Start: 2020-01-29 | End: 2020-01-29 | Stop reason: HOSPADM

## 2020-01-29 RX ORDER — METRONIDAZOLE 500 MG/1
500 TABLET ORAL 2 TIMES DAILY
Qty: 26 TABLET | Refills: 0 | Status: SHIPPED | OUTPATIENT
Start: 2020-01-29

## 2020-01-29 RX ORDER — DOXYCYCLINE 100 MG/1
100 CAPSULE ORAL EVERY 12 HOURS
Qty: 26 CAPSULE | Refills: 0 | Status: SHIPPED | OUTPATIENT
Start: 2020-01-29

## 2020-01-29 RX ADMIN — ONDANSETRON 4 MG: 4 TABLET, ORALLY DISINTEGRATING ORAL at 12:59

## 2020-01-29 RX ADMIN — SODIUM CHLORIDE 1000 ML: 900 INJECTION INTRAVENOUS at 03:47

## 2020-01-29 RX ADMIN — METRONIDAZOLE 500 MG: 500 TABLET ORAL at 09:25

## 2020-01-29 RX ADMIN — ACETAMINOPHEN 650 MG: 325 TABLET, FILM COATED ORAL at 06:50

## 2020-01-29 RX ADMIN — DOXYCYCLINE 100 MG: 100 CAPSULE ORAL at 09:25

## 2020-01-29 ASSESSMENT — ACTIVITIES OF DAILY LIVING (ADL)
ADLS_ACUITY_SCORE: 10

## 2020-01-29 NOTE — PLAN OF CARE
A/O x 4. VSS. Pt tolerating reg diet, states intermittent mild nausea. Up indep in room. Voiding without difficulty. Pt has transitioned to po antibiotics and plan is for discharge to home later today. Continue POC.    1430  Given zofran for nausea with relief. Pt ok'd for discharge. Discharge instructions given and explained. PIV removed prior to discharge. 2 discharge meds given and explained. Pt states she understands all instructions and has no questions. Plans to discharge when ride arrives at 1500.

## 2020-01-29 NOTE — PLAN OF CARE
VSS. Tmax 97.3. Independent in room. AOx4, neuro/CMS intact, no N/T. Pain improving, per pt localized to upper quadrants of abdomen. Worse with activity and deep inspiration. Tylenol 650 mg q4 given x2 with relief. Voiding spontaneously, pt still reporting pain with urination but denies urgency or frequency. Loose stool this AM, no BM this shift. Pt has immodium available. Lungs CTA. Pain on deep inspiration reported, enc use of IS and activity to promote pulmonary toileting. Walked x1 in halls. Reg/diet, appetite improved this shift, pt stated she ate first solids/meal (soup, pasta) since Friday 1/24. RN encouraged small meals and reminded pt about PRN zofran. Pt will let staff know if she thinks she needs a PRN. PIV LH infusing NS at 125 mL/hr. On PO antiinfectives flagyl, doxycycline.  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. Had friends visit this evening.

## 2020-01-29 NOTE — PROGRESS NOTES
[facility]  Gynecology Progress Note      S:  Patient doing well this morning. Abdominal pain present, but decreased from yesterday. Ate small amount yesterday. This did make her a bit nauseous but this resolved quickly. She has had no episodes of diarrhea since yesterday morning. Denies fevers and chills. Ambulated in the hallway several times yesterday without difficulty.   O:  Patient Vitals for the past 24 hrs:   BP Temp Temp src Pulse Heart Rate Resp SpO2   01/29/20 0615 112/63 97.1  F (36.2  C) Oral 81 -- 18 98 %   01/29/20 0134 118/62 97.7  F (36.5  C) Oral -- 79 15 100 %   01/28/20 2238 115/62 97.3  F (36.3  C) Oral 92 -- 16 98 %   01/28/20 2010 108/63 96.6  F (35.9  C) Oral -- 93 16 98 %   01/28/20 1601 118/63 96.6  F (35.9  C) Oral 90 -- 16 96 %   01/28/20 1150 113/60 96.8  F (36  C) Oral 91 -- 16 98 %   01/28/20 0820 117/66 96.4  F (35.8  C) Oral 94 -- 16 99 %     Gen: Resting comfortably, NAD  CV: RRR, no murmurs  Pulm: CTAB, no wheezes  Abd: Soft, mild tenderness to palpation in lower quadrants. No rebound or guarding  Ext: no LE edema b/l    WBC 19.8: 14.3: 12.3    A/P:  Ms. Karen Aguayo is a 24 year old G0 HD#4 admitted with fevers and abdominal pain. Initially thought to be pyelonephritis. CT consistent with TOA. Doing well. Tolerated PO, abdominal pain improving.     TOA:  - S/p rocephin x1 day, 2x ceftriaxone and 1 day ciprofloxacin  - S/p 24 hours cefoxitin 2g q6h, doxycline 100 mg q12h, metronidazole q12h  - D#2 PO regimen doxycline 100 mg q12h, metronidazole q12h  - Gc/chlam/wet prep negative  - Afebrile for >48 hours (Tmax and last 101.7 on 1-26)  - WBC down trending  - Tylenol prn for fevers, pain control    Pain with deep breaths. Likely aspect of atelectasis vs peritonitis from TOA  - PE highly unlikely. No SOB. VSS. O2 sat 100%.  - discussed increasing her movement, ambulating 4-5 times per day to increase airflow to lower lung parenchyma  - incentive  spirometer prn    Diarrhea:  - infectious workup negative  - hold stool softeners  - imodium prn  - influenza negative    Dispo: home later today with PO antibiotics    Jessi Harrington MD  OB/GYN, PGY-1  1/28/2020, 6:44 AM    [facility] staff:  Appreciate note by Dr. Harrington.  I have seen and examined the patient without the resident. I have reviewed, edited, and agree with the note.      Madeleine Chowdhury MD, FACOG  1/29/2020  1:19 PM

## 2020-02-01 LAB
BACTERIA SPEC CULT: NO GROWTH
Lab: NORMAL
SPECIMEN SOURCE: NORMAL

## 2020-02-22 ENCOUNTER — ANCILLARY PROCEDURE (OUTPATIENT)
Dept: ULTRASOUND IMAGING | Facility: CLINIC | Age: 25
End: 2020-02-22
Attending: NURSE PRACTITIONER
Payer: COMMERCIAL

## 2020-02-22 DIAGNOSIS — N70.93 TUBO-OVARIAN ABSCESS: ICD-10-CM

## 2025-02-11 NOTE — ED TRIAGE NOTES
Dr. Finney is currently unavailable. Patient last seen in the office by Dr. Bowen.     Please advise.    Patient here after being diagnosed with severe UTI and started on Cipro yesterday. States she has been feeling worse today, states she had a syncopal episode on the toilet this morning, states she has diffuse abdominal pain and vomiting.

## (undated) DEVICE — DRSG KERLIX FLUFFS X5

## (undated) DEVICE — CAST PLASTER SPLINT 4X15" 7394

## (undated) DEVICE — DRAPE C-ARM OEC MINI VIEW 6800   00-901917-01

## (undated) DEVICE — SLING ARM MED 79-99155

## (undated) DEVICE — SOL NACL 0.9% IRRIG 1000ML BOTTLE 2F7124

## (undated) DEVICE — SU VICRYL 3-0 SH 27" UND J416H

## (undated) DEVICE — LINEN ORTHO PACK 5446

## (undated) DEVICE — BLADE KNIFE SURG 15 371115

## (undated) DEVICE — PAD CHUX UNDERPAD 30X30"

## (undated) DEVICE — Device

## (undated) DEVICE — PACK HAND CUSTOM ASC

## (undated) DEVICE — GLOVE PROTEXIS BLUE W/NEU-THERA 6.5  2D73EB65

## (undated) DEVICE — SUCTION MANIFOLD NEPTUNE 2 SYS 1 PORT 702-025-000

## (undated) DEVICE — SU MONOCRYL 4-0 PS-2 18" UND Y496G

## (undated) DEVICE — DRSG STERI STRIP 1/2X4" R1547

## (undated) DEVICE — GLOVE PROTEXIS POWDER FREE SMT 6.5  2D72PT65X

## (undated) DEVICE — COVER CAMERA IN-LIGHT DISP LT-C02

## (undated) DEVICE — BRUSH SURGICAL SCRUB W/4% CHG SOL 25ML 371073

## (undated) DEVICE — CAST PADDING 4" STERILE 9044S

## (undated) DEVICE — PREP CHLORAPREP 26ML TINTED ORANGE  260815

## (undated) RX ORDER — PROPOFOL 10 MG/ML
INJECTION, EMULSION INTRAVENOUS
Status: DISPENSED
Start: 2018-05-15

## (undated) RX ORDER — ACETAMINOPHEN 325 MG/1
TABLET ORAL
Status: DISPENSED
Start: 2018-05-15

## (undated) RX ORDER — FENTANYL CITRATE 50 UG/ML
INJECTION, SOLUTION INTRAMUSCULAR; INTRAVENOUS
Status: DISPENSED
Start: 2018-05-15

## (undated) RX ORDER — ONDANSETRON 2 MG/ML
INJECTION INTRAMUSCULAR; INTRAVENOUS
Status: DISPENSED
Start: 2018-05-15

## (undated) RX ORDER — LIDOCAINE HYDROCHLORIDE 10 MG/ML
INJECTION, SOLUTION EPIDURAL; INFILTRATION; INTRACAUDAL; PERINEURAL
Status: DISPENSED
Start: 2018-05-15

## (undated) RX ORDER — KETOROLAC TROMETHAMINE 30 MG/ML
INJECTION, SOLUTION INTRAMUSCULAR; INTRAVENOUS
Status: DISPENSED
Start: 2018-05-15

## (undated) RX ORDER — KETAMINE HYDROCHLORIDE 10 MG/ML
INJECTION INTRAMUSCULAR; INTRAVENOUS
Status: DISPENSED
Start: 2018-05-15

## (undated) RX ORDER — CEFAZOLIN SODIUM 1 G/3ML
INJECTION, POWDER, FOR SOLUTION INTRAMUSCULAR; INTRAVENOUS
Status: DISPENSED
Start: 2018-05-15

## (undated) RX ORDER — BUPIVACAINE HYDROCHLORIDE 5 MG/ML
INJECTION, SOLUTION EPIDURAL; INTRACAUDAL
Status: DISPENSED
Start: 2018-05-15

## (undated) RX ORDER — GABAPENTIN 300 MG/1
CAPSULE ORAL
Status: DISPENSED
Start: 2018-05-15